# Patient Record
Sex: FEMALE | Race: BLACK OR AFRICAN AMERICAN | Employment: FULL TIME | URBAN - METROPOLITAN AREA
[De-identification: names, ages, dates, MRNs, and addresses within clinical notes are randomized per-mention and may not be internally consistent; named-entity substitution may affect disease eponyms.]

---
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
## 2020-01-29 PROBLEM — R10.2 PELVIC PAIN: Status: ACTIVE | Noted: 2020-01-29

## 2020-02-05 PROBLEM — Z30.46 NEXPLANON REMOVAL: Status: ACTIVE | Noted: 2020-02-05

## 2020-05-14 PROBLEM — Z30.46 NEXPLANON REMOVAL: Status: RESOLVED | Noted: 2020-02-05 | Resolved: 2020-05-14

## 2020-05-14 PROBLEM — Z34.01 PRIMIGRAVIDA IN FIRST TRIMESTER: Status: ACTIVE | Noted: 2020-05-14

## 2020-05-14 PROBLEM — R10.2 PELVIC PAIN: Status: RESOLVED | Noted: 2020-01-29 | Resolved: 2020-05-14

## 2020-06-22 PROBLEM — O26.899 PELVIC PAIN IN PREGNANCY, ANTEPARTUM: Status: ACTIVE | Noted: 2020-06-22

## 2020-06-22 PROBLEM — R10.2 PELVIC PAIN IN PREGNANCY, ANTEPARTUM: Status: ACTIVE | Noted: 2020-06-22

## 2020-07-16 PROBLEM — Z34.01 PRIMIGRAVIDA IN FIRST TRIMESTER: Status: RESOLVED | Noted: 2020-05-14 | Resolved: 2020-07-16

## 2020-07-16 PROBLEM — O26.899 PELVIC PAIN IN PREGNANCY, ANTEPARTUM: Status: RESOLVED | Noted: 2020-06-22 | Resolved: 2020-07-16

## 2020-07-16 PROBLEM — Z34.02 PRIMIGRAVIDA IN SECOND TRIMESTER: Status: ACTIVE | Noted: 2020-05-14

## 2020-07-16 PROBLEM — R10.2 PELVIC PAIN IN PREGNANCY, ANTEPARTUM: Status: RESOLVED | Noted: 2020-06-22 | Resolved: 2020-07-16

## 2020-07-25 ENCOUNTER — HOSPITAL ENCOUNTER (EMERGENCY)
Age: 20
Discharge: HOME | End: 2020-07-25
Attending: EMERGENCY MEDICINE
Payer: COMMERCIAL

## 2020-07-25 VITALS
SYSTOLIC BLOOD PRESSURE: 102 MMHG | DIASTOLIC BLOOD PRESSURE: 62 MMHG | TEMPERATURE: 98.6 F | HEIGHT: 64 IN | RESPIRATION RATE: 17 BRPM | BODY MASS INDEX: 20.14 KG/M2 | OXYGEN SATURATION: 100 % | HEART RATE: 96 BPM | WEIGHT: 118 LBS

## 2020-07-25 DIAGNOSIS — T78.40XA ALLERGIC REACTION, INITIAL ENCOUNTER: Primary | ICD-10-CM

## 2020-07-25 PROCEDURE — 74011250636 HC RX REV CODE- 250/636: Performed by: EMERGENCY MEDICINE

## 2020-07-25 PROCEDURE — 74011250637 HC RX REV CODE- 250/637: Performed by: EMERGENCY MEDICINE

## 2020-07-25 PROCEDURE — 99283 EMERGENCY DEPT VISIT LOW MDM: CPT

## 2020-07-25 PROCEDURE — 96372 THER/PROPH/DIAG INJ SC/IM: CPT

## 2020-07-25 RX ORDER — FAMOTIDINE 20 MG/1
40 TABLET, FILM COATED ORAL
Status: COMPLETED | OUTPATIENT
Start: 2020-07-25 | End: 2020-07-25

## 2020-07-25 RX ORDER — RANITIDINE 150 MG/1
150 TABLET, FILM COATED ORAL 2 TIMES DAILY
Qty: 10 TAB | Refills: 0 | Status: SHIPPED | OUTPATIENT
Start: 2020-07-25 | End: 2020-07-30

## 2020-07-25 RX ORDER — DIPHENHYDRAMINE HCL 25 MG
50 CAPSULE ORAL
Status: COMPLETED | OUTPATIENT
Start: 2020-07-25 | End: 2020-07-25

## 2020-07-25 RX ORDER — DIPHENHYDRAMINE HCL 25 MG
25 CAPSULE ORAL
Qty: 20 CAP | Refills: 0 | Status: SHIPPED | OUTPATIENT
Start: 2020-07-25 | End: 2020-07-30

## 2020-07-25 RX ORDER — DEXAMETHASONE SODIUM PHOSPHATE 100 MG/10ML
10 INJECTION INTRAMUSCULAR; INTRAVENOUS
Status: COMPLETED | OUTPATIENT
Start: 2020-07-25 | End: 2020-07-25

## 2020-07-25 RX ORDER — PREDNISONE 50 MG/1
50 TABLET ORAL DAILY
Qty: 5 TAB | Refills: 0 | Status: SHIPPED | OUTPATIENT
Start: 2020-07-25 | End: 2020-07-30

## 2020-07-25 RX ORDER — EPINEPHRINE 0.3 MG/.3ML
0.3 INJECTION SUBCUTANEOUS
Qty: 2 SYRINGE | Refills: 0 | Status: SHIPPED | OUTPATIENT
Start: 2020-07-25 | End: 2020-07-25

## 2020-07-25 RX ADMIN — FAMOTIDINE 40 MG: 20 TABLET, FILM COATED ORAL at 21:26

## 2020-07-25 RX ADMIN — DEXAMETHASONE SODIUM PHOSPHATE 10 MG: 10 INJECTION INTRAMUSCULAR; INTRAVENOUS at 21:26

## 2020-07-25 RX ADMIN — DIPHENHYDRAMINE HYDROCHLORIDE 50 MG: 25 CAPSULE ORAL at 21:26

## 2020-07-25 NOTE — LETTER
[id number] [address] EMERGENCY DEPT 
[address] 
[address] [id number] 
[phone] Work/School Note Date: 7/25/2020 To Whom It May concern: 
 
Loyda Bass was seen and treated today in the emergency room by the following provider(s): 
No providers found. Mahsa Ramos may return to work on 7/28/2020.  
 
Sincerely,

## 2020-07-26 NOTE — ED NOTES
I have reviewed discharge instructions with the patient. The patient verbalized understanding. Patient left ED via Discharge Method: ambulatory to Home with self. Opportunity for questions and clarification provided. Patient given 4 scripts. To continue your aftercare when you leave the hospital, you may receive an automated call from our care team to check in on how you are doing. This is a free service and part of our promise to provide the best care and service to meet your aftercare needs.  If you have questions, or wish to unsubscribe from this service please call [phone]. Thank you for Choosing our [facility] Emergency Department.

## 2020-07-26 NOTE — ED PROVIDER NOTES
Mask was worn during the entire patient examination. Sindhu Mcwilliams is a 21 y.o. female who presents to the ED with a chief complaint of rash. Patient states it is gradually worsened throughout the day today. She states almost everywhere now. She feels it on all extremities and on her torso. She has no shortness of breath. She did have one small area on her upper lip that did swell a little. No tongue swelling. She does not know of anything new that she got into today.            Past Medical History:   Diagnosis Date    Asthma     EIA, as child    Primigravida in first trimester 5/14/2020    EDC by 7 1/7 week US - irreg menses       Past Surgical History:   Procedure Laterality Date    HX ORTHOPAEDIC Left     ACL reconstruction    HX TONSILLECTOMY           Family History:   Problem Relation Age of Onset    Seizures Maternal Grandmother     Hypertension Maternal Grandmother     Hypertension Maternal Aunt     Diabetes Maternal Aunt     Breast Cancer Neg Hx     Colon Cancer Neg Hx     Ovarian Cancer Neg Hx     Uterine Cancer Neg Hx        Social History     Socioeconomic History    Marital status: SINGLE     Spouse name: Not on file    Number of children: Not on file    Years of education: Not on file    Highest education level: Not on file   Occupational History    Occupation: [employer] and [employer]   Social Needs    Financial resource strain: Not on file    Food insecurity     Worry: Not on file     Inability: Not on file   [employer] needs     Medical: Not on file     Non-medical: Not on file   Tobacco Use    Smoking status: Never Smoker    Smokeless tobacco: Never Used   Substance and Sexual Activity    Alcohol use: Not Currently    Drug use: Never    Sexual activity: Yes   Lifestyle    Physical activity     Days per week: Not on file     Minutes per session: Not on file    Stress: Not on file   Relationships    Social connections     Talks on phone: Not on file     Gets together: Not on file     Attends Bahai service: Not on file     Active member of club or organization: Not on file     Attends meetings of clubs or organizations: Not on file     Relationship status: Not on file    Intimate partner violence     Fear of current or ex partner: Not on file     Emotionally abused: Not on file     Physically abused: Not on file     Forced sexual activity: Not on file   Other Topics Concern     Service Not Asked    Blood Transfusions Not Asked    Caffeine Concern No    Occupational Exposure Not Asked   [place] Hazards Not Asked    Sleep Concern Not Asked    Stress Concern Not Asked    Weight Concern Not Asked    Special Diet Not Asked    Back Care Not Asked    Exercise No    Bike Helmet Not Asked    Seat Belt Yes    Self-Exams No   Social History Narrative    Lives with grandmother    Abuse: Feels safe at home, no history of physical abuse, no history of sexual abuse         ALLERGIES: Patient has no known allergies. Review of Systems   Constitutional: Negative for chills and fever. HENT: Negative for dental problem and voice change. Respiratory: Negative for cough, chest tightness, shortness of breath, wheezing and stridor. Cardiovascular: Negative for chest pain and palpitations. Gastrointestinal: Negative for abdominal pain, diarrhea, nausea, rectal pain and vomiting. Genitourinary: Negative for dysuria. Musculoskeletal: Negative for arthralgias, neck pain and neck stiffness. Skin: Negative for color change, pallor and wound. Neurological: Negative for weakness and numbness. All other systems reviewed and are negative. Vitals:    07/25/20 2010   BP: 106/61   Pulse: 84   Resp: 17   Temp: 98.6 °F (37 °C)   SpO2: 99%   Weight: 53.5 kg (118 lb)   Height: 5' 4\" (1.626 m)            Physical Exam  Vitals signs and nursing note reviewed. Constitutional:       General: She is not in acute distress. Appearance: Normal appearance.  She is well-developed. She is not ill-appearing, toxic-appearing or diaphoretic. HENT:      Head: Normocephalic and atraumatic. Jaw: No trismus or tenderness. Nose: Nose normal. No congestion or rhinorrhea. Mouth/Throat:      Mouth: Mucous membranes are moist.      Pharynx: Oropharynx is clear. No pharyngeal swelling, oropharyngeal exudate, posterior oropharyngeal erythema or uvula swelling. Comments: Upper lip has minimal central swelling. Eyes:      General: No scleral icterus. Conjunctiva/sclera: Conjunctivae normal.   Neck:      Musculoskeletal: No neck rigidity. Pulmonary:      Effort: Pulmonary effort is normal. No respiratory distress. Breath sounds: No stridor. No wheezing, rhonchi or rales. Abdominal:      General: Abdomen is flat. There is no distension. Palpations: There is no mass. Tenderness: There is no abdominal tenderness. There is no guarding or rebound. Hernia: No hernia is present. Skin:     General: Skin is warm. Coloration: Skin is not jaundiced or pale. Findings: Rash present. No bruising or erythema. Comments: Diffuse faint urticaria present. Patient scratching and itchy. Neurological:      General: No focal deficit present. Mental Status: She is alert and oriented to person, place, and time. Mental status is at baseline. Cranial Nerves: No cranial nerve deficit. Sensory: No sensory deficit. Psychiatric:         Mood and Affect: Mood normal.         Behavior: Behavior normal.          MDM  Number of Diagnoses or Management Options  Allergic reaction, initial encounter:   Diagnosis management comments: Patient has continued to improve on several re-evaluations that I had with her. Her lungs are clear. Her rash is clearing as well. And there is no airway swelling. Upper lip has improved as well. Patient going home with family will treat with steroids and antihistamines.   She was also given an EpiPen for symptomatic care. Mortimer Innocent, MD; 7/25/2020 @10:39 PM Voice dictation software was used during the making of this note. This software is not perfect and grammatical and other typographical errors may be present.   This note has not been proofread for errors.  ===================================================================            Procedures

## 2020-07-26 NOTE — ED TRIAGE NOTES
Pt c/o rash that started on bilateral arms and legs yesterday and today has spread to neck and on inside upper lip. Pt denies any changes in lotions, detergents. Pt denies taking any benadryl. Pt states she is 17 weeks pregnant. Pt masked upon arrival to the ED.

## 2020-07-29 PROBLEM — T78.40XA ALLERGIC REACTION: Status: ACTIVE | Noted: 2020-07-29

## 2020-09-14 PROBLEM — T78.40XA ALLERGIC REACTION: Status: RESOLVED | Noted: 2020-07-29 | Resolved: 2020-09-14

## 2020-09-14 PROBLEM — O26.842 SIZE OF FETUS INCONSISTENT WITH DATES IN SECOND TRIMESTER: Status: ACTIVE | Noted: 2020-09-14

## 2020-10-14 PROBLEM — O99.013 ANEMIA AFFECTING PREGNANCY IN THIRD TRIMESTER: Status: ACTIVE | Noted: 2020-10-14

## 2020-10-26 PROBLEM — Z34.03 PRIMIGRAVIDA IN THIRD TRIMESTER: Status: ACTIVE | Noted: 2020-05-14

## 2020-11-18 PROBLEM — O21.9 VOMITING DURING PREGNANCY IN THIRD TRIMESTER: Status: ACTIVE | Noted: 2020-11-18

## 2020-11-23 PROBLEM — O21.9 VOMITING DURING PREGNANCY IN THIRD TRIMESTER: Status: RESOLVED | Noted: 2020-11-18 | Resolved: 2020-11-23

## 2020-11-23 PROBLEM — O26.843 SIZE OF FETUS INCONSISTENT WITH DATES IN THIRD TRIMESTER: Status: ACTIVE | Noted: 2020-09-14

## 2020-12-09 PROBLEM — O16.3 ELEVATED BLOOD PRESSURE AFFECTING PREGNANCY IN THIRD TRIMESTER, ANTEPARTUM: Status: ACTIVE | Noted: 2020-12-09

## 2020-12-10 ENCOUNTER — HOSPITAL ENCOUNTER (INPATIENT)
Age: 20
LOS: 3 days | Discharge: HOME | DRG: 560 | End: 2020-12-13
Attending: OBSTETRICS & GYNECOLOGY | Admitting: OBSTETRICS & GYNECOLOGY
Payer: COMMERCIAL

## 2020-12-10 DIAGNOSIS — G89.18 POSTOPERATIVE PAIN: Primary | ICD-10-CM

## 2020-12-10 PROBLEM — O13.3 GESTATIONAL HYPERTENSION, THIRD TRIMESTER: Status: ACTIVE | Noted: 2020-12-09

## 2020-12-10 LAB
ABO + RH BLD: NORMAL
ALBUMIN SERPL-MCNC: 2.5 G/DL (ref 3.5–5)
ALBUMIN/GLOB SERPL: 0.7 {RATIO} (ref 1.2–3.5)
ALP SERPL-CCNC: 122 U/L (ref 50–130)
ALT SERPL-CCNC: 35 U/L (ref 12–65)
ANION GAP SERPL CALC-SCNC: 7 MMOL/L (ref 7–16)
AST SERPL-CCNC: 29 U/L (ref 15–37)
BILIRUB SERPL-MCNC: 0.3 MG/DL (ref 0.2–1.1)
BLOOD GROUP ANTIBODIES SERPL: NORMAL
BUN SERPL-MCNC: 3 MG/DL (ref 6–23)
CALCIUM SERPL-MCNC: 9 MG/DL (ref 8.3–10.4)
CHLORIDE SERPL-SCNC: 108 MMOL/L (ref 98–107)
CO2 SERPL-SCNC: 25 MMOL/L (ref 21–32)
CREAT SERPL-MCNC: 0.59 MG/DL (ref 0.6–1)
ERYTHROCYTE [DISTWIDTH] IN BLOOD BY AUTOMATED COUNT: 13.2 % (ref 11.9–14.6)
GLOBULIN SER CALC-MCNC: 3.8 G/DL (ref 2.3–3.5)
GLUCOSE SERPL-MCNC: 73 MG/DL (ref 65–100)
HCT VFR BLD AUTO: 34.1 % (ref 35.8–46.3)
HGB BLD-MCNC: 11.6 G/DL (ref 11.7–15.4)
MCH RBC QN AUTO: 30.1 PG (ref 26.1–32.9)
MCHC RBC AUTO-ENTMCNC: 34 G/DL (ref 31.4–35)
MCV RBC AUTO: 88.6 FL (ref 79.6–97.8)
NRBC # BLD: 0 K/UL (ref 0–0.2)
PLATELET # BLD AUTO: 231 K/UL (ref 150–450)
PMV BLD AUTO: 11.2 FL (ref 9.4–12.3)
POTASSIUM SERPL-SCNC: 3.5 MMOL/L (ref 3.5–5.1)
PROT SERPL-MCNC: 6.3 G/DL (ref 6.3–8.2)
RBC # BLD AUTO: 3.85 M/UL (ref 4.05–5.2)
SODIUM SERPL-SCNC: 140 MMOL/L (ref 136–145)
SPECIMEN EXP DATE BLD: NORMAL
WBC # BLD AUTO: 6.5 K/UL (ref 4.3–11.1)

## 2020-12-10 PROCEDURE — 85027 COMPLETE CBC AUTOMATED: CPT

## 2020-12-10 PROCEDURE — 00HU33Z INSERTION OF INFUSION DEVICE INTO SPINAL CANAL, PERCUTANEOUS APPROACH: ICD-10-PCS | Performed by: ANESTHESIOLOGY

## 2020-12-10 PROCEDURE — 75410000002 HC LABOR FEE PER 1 HR: Performed by: OBSTETRICS & GYNECOLOGY

## 2020-12-10 PROCEDURE — 36415 COLL VENOUS BLD VENIPUNCTURE: CPT

## 2020-12-10 PROCEDURE — 65270000029 HC RM PRIVATE

## 2020-12-10 PROCEDURE — 86900 BLOOD TYPING SEROLOGIC ABO: CPT

## 2020-12-10 PROCEDURE — 80053 COMPREHEN METABOLIC PANEL: CPT

## 2020-12-10 PROCEDURE — 74011250637 HC RX REV CODE- 250/637: Performed by: OBSTETRICS & GYNECOLOGY

## 2020-12-10 PROCEDURE — 3E0P7VZ INTRODUCTION OF HORMONE INTO FEMALE REPRODUCTIVE, VIA NATURAL OR ARTIFICIAL OPENING: ICD-10-PCS | Performed by: OBSTETRICS & GYNECOLOGY

## 2020-12-10 RX ORDER — MINERAL OIL
120 OIL (ML) ORAL
Status: DISPENSED | OUTPATIENT
Start: 2020-12-10 | End: 2020-12-11

## 2020-12-10 RX ORDER — ACETAMINOPHEN 500 MG
1000 TABLET ORAL
Status: DISCONTINUED | OUTPATIENT
Start: 2020-12-10 | End: 2020-12-11

## 2020-12-10 RX ORDER — FAMOTIDINE 20 MG/1
20 TABLET, FILM COATED ORAL
Status: DISCONTINUED | OUTPATIENT
Start: 2020-12-10 | End: 2020-12-11

## 2020-12-10 RX ORDER — MISOPROSTOL 100 UG/1
50 TABLET ORAL EVERY 4 HOURS
Status: DISCONTINUED | OUTPATIENT
Start: 2020-12-10 | End: 2020-12-11

## 2020-12-10 RX ORDER — LIDOCAINE HYDROCHLORIDE 10 MG/ML
1 INJECTION INFILTRATION; PERINEURAL
Status: ACTIVE | OUTPATIENT
Start: 2020-12-10 | End: 2020-12-11

## 2020-12-10 RX ORDER — LIDOCAINE HYDROCHLORIDE 20 MG/ML
JELLY TOPICAL
Status: ACTIVE | OUTPATIENT
Start: 2020-12-10 | End: 2020-12-11

## 2020-12-10 RX ORDER — BUTORPHANOL TARTRATE 2 MG/ML
1 INJECTION INTRAMUSCULAR; INTRAVENOUS
Status: DISCONTINUED | OUTPATIENT
Start: 2020-12-10 | End: 2020-12-11 | Stop reason: HOSPADM

## 2020-12-10 RX ORDER — ZOLPIDEM TARTRATE 5 MG/1
5 TABLET ORAL
Status: DISCONTINUED | OUTPATIENT
Start: 2020-12-10 | End: 2020-12-11

## 2020-12-10 RX ADMIN — MISOPROSTOL 50 MCG: 100 TABLET ORAL at 11:04

## 2020-12-10 RX ADMIN — MISOPROSTOL 50 MCG: 100 TABLET ORAL at 15:04

## 2020-12-10 NOTE — PROGRESS NOTES
Pt received 1st cytotec 50mcg at 11am  Reports mild back discomfort  R strip with no decels. Ctx's q3min  Repeat lft's wnl    No severe features. Pt reports she has a significant HA 2d ago. At that time her bp at work was 150/90 range. Will watch for severe s/s.

## 2020-12-10 NOTE — PROGRESS NOTES
Report received from Dr Tj Tolentino. I can't see labs from yday.  He reported slightly elevated lft's  Will repeat today  37+ wks by early US  GBS neg  vtx on recent [address]

## 2020-12-10 NOTE — H&P
[employer] OB H&P      Assessment/Plan    Problem List  Date Reviewed: 12/10/2020          Codes Class    Gestational hypertension, third trimester ICD-10-CM: O13.3  ICD-9-CM: 642.33     Overview Signed 2020  9:57 AM by Maria Del Carmen Gallo MD     2020:  Initial /90             Anemia affecting pregnancy in third trimester ICD-10-CM: O99.013  ICD-9-CM: 648.23, 285.9     Overview Addendum 2020  3:33 PM by Maria Del Carmen Gallo MD     Pt unable to tolerate additional Fe  2020:  FS Hgb 12.9             Size of fetus inconsistent with dates in third trimester ICD-10-CM: O26.843  ICD-9-CM: 649.63     Overview Addendum 2020  3:27 PM by Maria Del Carmen Gallo MD     2020:  [place] [address] at 25 weeks, [address] next visit  10/12/2020: EFW 70%, AC 63%, THANH nl, vertex  2020:  EFW 66%, AC 73%, THANH 15.1 cm, vtx             Primigravida in third trimester ICD-10-CM: Z34.03  ICD-9-CM: V22.0     Overview Addendum 2020  8:52 AM by Maria Del Carmen Gallo MD     EDC by 7 1/7 week US - irreg menses    2020: CF, SMA, Tetra neg  [id number]: Anatomy wnl    10/12/2020: Flu vaccine given today, TDAP recommended,                    Subjective    Mahsa Chen 21 y.o.  37w1d  presented to L&D for IOL for gest HTN. Pt has no complaints today. Pt denies vaginal bleeding/discharge. No LOF.  +FM.       OB History    Para Term  AB Living   1 0 0 0 0 0   SAB TAB Ectopic Molar Multiple Live Births   0 0 0 0 0 0      # Outcome Date GA Lbr Filippo/2nd Weight Sex Delivery Anes PTL Lv   1 Current                Past Medical History:   Diagnosis Date    Asthma     EIA, as child    Irregular heart beat     during surgery on knee    Primigravida in first trimester 2020    EDC by 7 1/7 week US - irreg menses       Past Surgical History:   Procedure Laterality Date    HX ORTHOPAEDIC Left     ACL reconstruction    HX TONSILLECTOMY         Family History   Problem Relation Age of Onset  Seizures Maternal Grandmother     Hypertension Maternal Grandmother     Hypertension Maternal Aunt     Diabetes Maternal Aunt     Breast Cancer Neg Hx     Colon Cancer Neg Hx     Ovarian Cancer Neg Hx     Uterine Cancer Neg Hx        Social History     Socioeconomic History    Marital status: SINGLE     Spouse name: Not on file    Number of children: Not on file    Years of education: Not on file    Highest education level: Not on file   Occupational History    Occupation: [employer]   Social Needs    Financial resource strain: Not on file    Food insecurity     Worry: Not on file     Inability: Not on file   [employer] needs     Medical: Not on file     Non-medical: Not on file   Tobacco Use    Smoking status: Never Smoker    Smokeless tobacco: Never Used   Substance and Sexual Activity    Alcohol use: Not Currently    Drug use: Never    Sexual activity: Yes   Lifestyle    Physical activity     Days per week: Not on file     Minutes per session: Not on file    Stress: Not on file   Relationships    Social connections     Talks on phone: Not on file     Gets together: Not on file     Attends Sikhism service: Not on file     Active member of club or organization: Not on file     Attends meetings of clubs or organizations: Not on file     Relationship status: Not on file    Intimate partner violence     Fear of current or ex partner: Not on file     Emotionally abused: Not on file     Physically abused: Not on file     Forced sexual activity: Not on file   Other Topics Concern     Service Not Asked    Blood Transfusions Not Asked    Caffeine Concern No    Occupational Exposure Not Asked   Raymond Lunch Hazards Not Asked    Sleep Concern Not Asked    Stress Concern Not Asked    Weight Concern Not Asked    Special Diet Not Asked    Back Care Not Asked    Exercise No    Bike Helmet Not Asked    Seat Belt Yes    Self-Exams No   Social History Narrative    Lives with grandmother    Abuse: Feels safe at home, no history of physical abuse, no history of sexual abuse       No Known Allergies      Review of Systems:    Constitutional: No fevers or chills     Prenatal: + fetal movement, no VB/DC, no LOF     CV: No chest pain or palpatations    Resp: No SOB or cough    GI: No nausea/vomiting/diarrhea/constipation    Neuro: No HA, no seizure like activity    Skin: No rashes or lesions     Breast: No breast pain    : No dysuria or hematuria      Prenatal Record Review    The prenatal record has been reviewed.     Prenatal Labs:   No results found for: RUBELLAEXT, GRBSEXT, HBSAGEXT, HIVEXT, RPREXT, GONNOEXT, CHLAMEXT    Objective    Visit Vitals  LMP 01/30/2020         Obstetric Exam    SVE: 1/70/-3      Physical Exam    Gen: alert and cooperative, NAD    HEENT: NCAT    CV: RRR    RESP: CTA bilat    ABD: Gravid, soft, NT    EXT: trace edema bilat    NEURO: No focal deficits    SKIN: No noted rashes or lesions       Nina Molina MD  10:13 AM  12/10/20

## 2020-12-11 ENCOUNTER — ANESTHESIA EVENT (OUTPATIENT)
Dept: LABOR AND DELIVERY | Age: 20
DRG: 560 | End: 2020-12-11
Payer: COMMERCIAL

## 2020-12-11 ENCOUNTER — ANESTHESIA (OUTPATIENT)
Dept: LABOR AND DELIVERY | Age: 20
DRG: 560 | End: 2020-12-11
Payer: COMMERCIAL

## 2020-12-11 PROBLEM — O14.90 PRE-ECLAMPSIA: Status: ACTIVE | Noted: 2020-12-11

## 2020-12-11 LAB
ARTERIAL PATENCY WRIST A: ABNORMAL
ARTERIAL PATENCY WRIST A: ABNORMAL
BASE DEFICIT BLD-SCNC: 3 MMOL/L
BASE DEFICIT BLD-SCNC: 4 MMOL/L
BDY SITE: ABNORMAL
BDY SITE: ABNORMAL
CA-I BLD-MCNC: 1.48 MMOL/L (ref 1.12–1.32)
CA-I BLD-MCNC: 1.49 MMOL/L (ref 1.12–1.32)
COLLECT TIME,HTIME: 1501
COLLECT TIME,HTIME: 1501
GAS FLOW.O2 O2 DELIVERY SYS: ABNORMAL L/MIN
GAS FLOW.O2 O2 DELIVERY SYS: ABNORMAL L/MIN
GLUCOSE BLD STRIP.AUTO-MCNC: 66 MG/DL (ref 65–100)
GLUCOSE BLD STRIP.AUTO-MCNC: 70 MG/DL (ref 65–100)
HCO3 BLD-SCNC: 23.1 MMOL/L (ref 22–26)
HCO3 BLD-SCNC: 23.6 MMOL/L (ref 22–26)
PCO2 BLD: 44.2 MMHG (ref 35–45)
PCO2 BLD: 48.8 MMHG (ref 35–45)
PH BLD: 7.29 [PH] (ref 7.35–7.45)
PH BLD: 7.33 [PH] (ref 7.35–7.45)
PO2 BLD: 18 MMHG (ref 75–100)
PO2 BLD: 22 MMHG (ref 75–100)
POTASSIUM BLD-SCNC: 4.1 MMOL/L (ref 3.5–5.1)
POTASSIUM BLD-SCNC: 5.8 MMOL/L (ref 3.5–5.1)
SAO2 % BLD: 21 % (ref 95–98)
SAO2 % BLD: 34 % (ref 95–98)
SERVICE CMNT-IMP: ABNORMAL
SERVICE CMNT-IMP: ABNORMAL
SODIUM BLD-SCNC: 136 MMOL/L (ref 136–145)
SODIUM BLD-SCNC: 138 MMOL/L (ref 136–145)
SPECIMEN TYPE: ABNORMAL
SPECIMEN TYPE: ABNORMAL

## 2020-12-11 PROCEDURE — 77030019905 HC CATH URETH INTMIT MDII -A

## 2020-12-11 PROCEDURE — 75410000003 HC RECOV DEL/VAG/CSECN EA 0.5 HR

## 2020-12-11 PROCEDURE — 77030011945 HC CATH URIN INT ST MENT -A

## 2020-12-11 PROCEDURE — 75410000000 HC DELIVERY VAGINAL/SINGLE

## 2020-12-11 PROCEDURE — 82803 BLOOD GASES ANY COMBINATION: CPT

## 2020-12-11 PROCEDURE — A4300 CATH IMPL VASC ACCESS PORTAL: HCPCS | Performed by: ANESTHESIOLOGY

## 2020-12-11 PROCEDURE — 74011000250 HC RX REV CODE- 250: Performed by: ANESTHESIOLOGY

## 2020-12-11 PROCEDURE — 0HQ9XZZ REPAIR PERINEUM SKIN, EXTERNAL APPROACH: ICD-10-PCS | Performed by: OBSTETRICS & GYNECOLOGY

## 2020-12-11 PROCEDURE — 74011250637 HC RX REV CODE- 250/637: Performed by: OBSTETRICS & GYNECOLOGY

## 2020-12-11 PROCEDURE — 74011250636 HC RX REV CODE- 250/636: Performed by: OBSTETRICS & GYNECOLOGY

## 2020-12-11 PROCEDURE — 77030018846 HC SOL IRR STRL H20 ICUM -A

## 2020-12-11 PROCEDURE — 82947 ASSAY GLUCOSE BLOOD QUANT: CPT

## 2020-12-11 PROCEDURE — 75410000002 HC LABOR FEE PER 1 HR

## 2020-12-11 PROCEDURE — 65270000029 HC RM PRIVATE

## 2020-12-11 PROCEDURE — 10907ZC DRAINAGE OF AMNIOTIC FLUID, THERAPEUTIC FROM PRODUCTS OF CONCEPTION, VIA NATURAL OR ARTIFICIAL OPENING: ICD-10-PCS | Performed by: OBSTETRICS & GYNECOLOGY

## 2020-12-11 PROCEDURE — 76060000078 HC EPIDURAL ANESTHESIA

## 2020-12-11 PROCEDURE — 74011250636 HC RX REV CODE- 250/636: Performed by: REGISTERED NURSE

## 2020-12-11 PROCEDURE — 77030014125 HC TY EPDRL BBMI -B: Performed by: ANESTHESIOLOGY

## 2020-12-11 PROCEDURE — 74011250636 HC RX REV CODE- 250/636: Performed by: NURSE ANESTHETIST, CERTIFIED REGISTERED

## 2020-12-11 PROCEDURE — 74011000250 HC RX REV CODE- 250: Performed by: OBSTETRICS & GYNECOLOGY

## 2020-12-11 PROCEDURE — 77010026065 HC OXYGEN MINIMUM MEDICAL AIR

## 2020-12-11 PROCEDURE — 77030003028 HC SUT VCRL J&J -A

## 2020-12-11 PROCEDURE — 2709999900 HC NON-CHARGEABLE SUPPLY

## 2020-12-11 RX ORDER — NALOXONE HYDROCHLORIDE 0.4 MG/ML
0.4 INJECTION, SOLUTION INTRAMUSCULAR; INTRAVENOUS; SUBCUTANEOUS AS NEEDED
Status: DISCONTINUED | OUTPATIENT
Start: 2020-12-11 | End: 2020-12-13 | Stop reason: HOSPADM

## 2020-12-11 RX ORDER — SODIUM CHLORIDE 0.9 % (FLUSH) 0.9 %
5-40 SYRINGE (ML) INJECTION AS NEEDED
Status: DISCONTINUED | OUTPATIENT
Start: 2020-12-11 | End: 2020-12-11

## 2020-12-11 RX ORDER — DEXTROSE, SODIUM CHLORIDE, SODIUM LACTATE, POTASSIUM CHLORIDE, AND CALCIUM CHLORIDE 5; .6; .31; .03; .02 G/100ML; G/100ML; G/100ML; G/100ML; G/100ML
125 INJECTION, SOLUTION INTRAVENOUS CONTINUOUS
Status: DISCONTINUED | OUTPATIENT
Start: 2020-12-11 | End: 2020-12-11

## 2020-12-11 RX ORDER — SIMETHICONE 80 MG
80 TABLET,CHEWABLE ORAL
Status: DISCONTINUED | OUTPATIENT
Start: 2020-12-11 | End: 2020-12-13 | Stop reason: HOSPADM

## 2020-12-11 RX ORDER — ROPIVACAINE HYDROCHLORIDE 2 MG/ML
INJECTION, SOLUTION EPIDURAL; INFILTRATION; PERINEURAL
Status: DISCONTINUED | OUTPATIENT
Start: 2020-12-11 | End: 2020-12-11 | Stop reason: HOSPADM

## 2020-12-11 RX ORDER — DIPHENHYDRAMINE HCL 25 MG
25-50 CAPSULE ORAL
Status: DISCONTINUED | OUTPATIENT
Start: 2020-12-11 | End: 2020-12-13 | Stop reason: HOSPADM

## 2020-12-11 RX ORDER — OXYTOCIN/RINGER'S LACTATE 30/500 ML
10 PLASTIC BAG, INJECTION (ML) INTRAVENOUS AS NEEDED
Status: COMPLETED | OUTPATIENT
Start: 2020-12-11 | End: 2020-12-11

## 2020-12-11 RX ORDER — ZOLPIDEM TARTRATE 5 MG/1
10 TABLET ORAL
Status: DISCONTINUED | OUTPATIENT
Start: 2020-12-11 | End: 2020-12-13 | Stop reason: HOSPADM

## 2020-12-11 RX ORDER — OXYTOCIN/RINGER'S LACTATE 30/500 ML
0-20 PLASTIC BAG, INJECTION (ML) INTRAVENOUS
Status: DISCONTINUED | OUTPATIENT
Start: 2020-12-11 | End: 2020-12-11

## 2020-12-11 RX ORDER — DOCUSATE SODIUM 100 MG/1
100 CAPSULE, LIQUID FILLED ORAL 2 TIMES DAILY
Status: DISCONTINUED | OUTPATIENT
Start: 2020-12-11 | End: 2020-12-13 | Stop reason: HOSPADM

## 2020-12-11 RX ORDER — MINERAL OIL
120 OIL (ML) ORAL AS NEEDED
Status: DISCONTINUED | OUTPATIENT
Start: 2020-12-11 | End: 2020-12-11

## 2020-12-11 RX ORDER — LIDOCAINE HYDROCHLORIDE 20 MG/ML
JELLY TOPICAL
Status: DISCONTINUED | OUTPATIENT
Start: 2020-12-11 | End: 2020-12-11 | Stop reason: CLARIF

## 2020-12-11 RX ORDER — LIDOCAINE HYDROCHLORIDE 10 MG/ML
10 INJECTION INFILTRATION; PERINEURAL
Status: COMPLETED | OUTPATIENT
Start: 2020-12-11 | End: 2020-12-11

## 2020-12-11 RX ORDER — LIDOCAINE HYDROCHLORIDE AND EPINEPHRINE 15; 5 MG/ML; UG/ML
INJECTION, SOLUTION EPIDURAL
Status: COMPLETED | OUTPATIENT
Start: 2020-12-11 | End: 2020-12-11

## 2020-12-11 RX ORDER — SODIUM CHLORIDE 0.9 % (FLUSH) 0.9 %
5-40 SYRINGE (ML) INJECTION EVERY 8 HOURS
Status: DISCONTINUED | OUTPATIENT
Start: 2020-12-11 | End: 2020-12-11

## 2020-12-11 RX ORDER — OXYTOCIN/RINGER'S LACTATE 30/500 ML
87.3 PLASTIC BAG, INJECTION (ML) INTRAVENOUS AS NEEDED
Status: DISCONTINUED | OUTPATIENT
Start: 2020-12-11 | End: 2020-12-11

## 2020-12-11 RX ORDER — DOCUSATE SODIUM 100 MG/1
100 CAPSULE, LIQUID FILLED ORAL 2 TIMES DAILY
Status: DISCONTINUED | OUTPATIENT
Start: 2020-12-11 | End: 2020-12-11

## 2020-12-11 RX ORDER — ROPIVACAINE HYDROCHLORIDE 2 MG/ML
INJECTION, SOLUTION EPIDURAL; INFILTRATION; PERINEURAL AS NEEDED
Status: DISCONTINUED | OUTPATIENT
Start: 2020-12-11 | End: 2020-12-11 | Stop reason: HOSPADM

## 2020-12-11 RX ORDER — FENTANYL CITRATE 50 UG/ML
INJECTION, SOLUTION INTRAMUSCULAR; INTRAVENOUS AS NEEDED
Status: DISCONTINUED | OUTPATIENT
Start: 2020-12-11 | End: 2020-12-11 | Stop reason: HOSPADM

## 2020-12-11 RX ORDER — LIDOCAINE HYDROCHLORIDE 20 MG/ML
JELLY TOPICAL
Status: COMPLETED | OUTPATIENT
Start: 2020-12-11 | End: 2020-12-11

## 2020-12-11 RX ORDER — HYDROCODONE BITARTRATE AND ACETAMINOPHEN 5; 325 MG/1; MG/1
1-2 TABLET ORAL
Status: DISCONTINUED | OUTPATIENT
Start: 2020-12-11 | End: 2020-12-13 | Stop reason: HOSPADM

## 2020-12-11 RX ORDER — HYDROCORTISONE 25 MG/G
CREAM TOPICAL 4 TIMES DAILY
Status: DISCONTINUED | OUTPATIENT
Start: 2020-12-11 | End: 2020-12-13 | Stop reason: HOSPADM

## 2020-12-11 RX ORDER — IBUPROFEN 800 MG/1
800 TABLET ORAL
Status: DISCONTINUED | OUTPATIENT
Start: 2020-12-11 | End: 2020-12-13 | Stop reason: HOSPADM

## 2020-12-11 RX ORDER — FENTANYL CITRATE 50 UG/ML
INJECTION, SOLUTION INTRAMUSCULAR; INTRAVENOUS
Status: COMPLETED
Start: 2020-12-11 | End: 2020-12-11

## 2020-12-11 RX ADMIN — MISOPROSTOL 50 MCG: 100 TABLET ORAL at 00:32

## 2020-12-11 RX ADMIN — Medication 2 MILLI-UNITS/MIN: at 06:58

## 2020-12-11 RX ADMIN — BUTORPHANOL TARTRATE 1 MG: 2 INJECTION, SOLUTION INTRAMUSCULAR; INTRAVENOUS at 04:27

## 2020-12-11 RX ADMIN — ACETAMINOPHEN 1000 MG: 500 TABLET, FILM COATED ORAL at 11:42

## 2020-12-11 RX ADMIN — SODIUM CHLORIDE, SODIUM LACTATE, POTASSIUM CHLORIDE, CALCIUM CHLORIDE, AND DEXTROSE MONOHYDRATE 125 ML/HR: 600; 310; 30; 20; 5 INJECTION, SOLUTION INTRAVENOUS at 06:09

## 2020-12-11 RX ADMIN — Medication 3 ML: at 12:19

## 2020-12-11 RX ADMIN — IBUPROFEN 800 MG: 800 TABLET ORAL at 19:46

## 2020-12-11 RX ADMIN — LIDOCAINE HYDROCHLORIDE 1 ML: 10 INJECTION, SOLUTION INFILTRATION; PERINEURAL at 15:11

## 2020-12-11 RX ADMIN — Medication 6 ML: at 06:01

## 2020-12-11 RX ADMIN — DOCUSATE SODIUM 100 MG: 100 CAPSULE, LIQUID FILLED ORAL at 19:46

## 2020-12-11 RX ADMIN — LIDOCAINE HYDROCHLORIDE,EPINEPHRINE BITARTRATE 3 ML: 15; .005 INJECTION, SOLUTION EPIDURAL; INFILTRATION; INTRACAUDAL; PERINEURAL at 05:59

## 2020-12-11 RX ADMIN — SODIUM CHLORIDE, SODIUM LACTATE, POTASSIUM CHLORIDE, AND CALCIUM CHLORIDE 500 ML: 600; 310; 30; 20 INJECTION, SOLUTION INTRAVENOUS at 05:45

## 2020-12-11 RX ADMIN — Medication 1 SPRAY: at 19:46

## 2020-12-11 RX ADMIN — ROPIVACAINE HYDROCHLORIDE 9 ML/HR: 2 INJECTION, SOLUTION EPIDURAL; INFILTRATION at 06:03

## 2020-12-11 RX ADMIN — LIDOCAINE HYDROCHLORIDE: 20 JELLY TOPICAL at 15:11

## 2020-12-11 RX ADMIN — Medication: at 15:11

## 2020-12-11 RX ADMIN — FENTANYL CITRATE 100 MCG: 50 INJECTION INTRAMUSCULAR; INTRAVENOUS at 12:22

## 2020-12-11 RX ADMIN — MINERAL OIL 120 ML: 1000 LIQUID ORAL at 13:50

## 2020-12-11 RX ADMIN — ROPIVACAINE HYDROCHLORIDE: 2 INJECTION, SOLUTION EPIDURAL; INFILTRATION at 09:41

## 2020-12-11 NOTE — PROGRESS NOTES
Report received from AISSATOU August RN. Pt care assumed. O2 applied. Pt c/o pain 6/10 and rectal pressure. SVE with moderate caput. Pt very uncomfortable with exam. CRNA called for dose.

## 2020-12-11 NOTE — PROGRESS NOTES
2/70/-1--2  arom clear fluid  130/mod variability/no decels/spont accels  Start pitocin when able. Last cytotec was 12:30.

## 2020-12-11 NOTE — PROGRESS NOTES
SBAR OUT Report: Mother    Verbal report given to RICH Dumont RN on this patient, who is now being transferred to [facility] for routine progression of care. The patient is not wearing a green \"Anesthesia-Duramorph\" band. Report consisted of patient's Situation, Background, Assessment and Recommendations (SBAR). [place] ID bands were compared with the identification form, and verified with the patient and receiving nurse. Information from the SBAR and the [facility] Report was reviewed with the receiving nurse; opportunity for questions and clarification provided.

## 2020-12-11 NOTE — PROGRESS NOTES
Clare Johansen at bedside at 9304. AISSATOU Katz at bedside at [facility] pt to sitting up on bedside at 0550. Timeout completed at 8361 with MD, AISSATOU and myself at bedside. Test dose given at 0559. Negative reaction. Dose given at 0605. Pt assisted to lying back in left tilt position. See anesthesia record for details. See vital sign flow sheet for BP. Tolerated procedure well.

## 2020-12-11 NOTE — PROGRESS NOTES
Dr. Harish Manzano updated on pt SVE 1.5/70/-3, contraction q 1-2 min at this time, and FHT with moderate variability but 2 variables. Give cytotec once contractions space out per MD. Last dose of cytotec should not be past 4am. Call MD at 6 am for update and pitocin orders.  Ot may have ambien, tylenol and pepcid PRN per MD.

## 2020-12-11 NOTE — ANESTHESIA PREPROCEDURE EVALUATION
Relevant Problems   No relevant active problems       Anesthetic History               Review of Systems / Medical History  Patient summary reviewed and pertinent labs reviewed    Pulmonary            Asthma        Neuro/Psych              Cardiovascular    Hypertension              Exercise tolerance: >4 METS  Comments: Reports h/o irregular HR with knee surgery that required a longer PACU stay, never saw a cardiologist, reports good exercise tolerance   GI/Hepatic/Renal  Within defined limits              Endo/Other  Within defined limits           Other Findings              Physical Exam    Airway  Mallampati: II  TM Distance: 4 - 6 cm  Neck ROM: normal range of motion   Mouth opening: Normal     Cardiovascular    Rhythm: regular  Rate: normal         Dental  No notable dental hx       Pulmonary  Breath sounds clear to auscultation               Abdominal         Other Findings            Anesthetic Plan    ASA: 2  Anesthesia type: epidural            Anesthetic plan and risks discussed with: Patient

## 2020-12-11 NOTE — PROGRESS NOTES
SBAR IN Report: Mother    Verbal report received from [employer]  on this patient, who is now being transferred from L&D for routine progression of care. The patient is not wearing a green \"Anesthesia-Duramorph\" band. Report consisted of patient's Situation, Background, Assessment and Recommendations (SBAR).  ID bands were compared with the identification form, and verified with the patient and transferring nurse. Information from the SBAR and the [facility] Report was reviewed with the transferring nurse; opportunity for questions and clarification provided.

## 2020-12-11 NOTE — PROGRESS NOTES
Patient up to bathroom with RN assistance. Saige-care taught and completed. Questions encouraged and answered. Patient ambulating without difficulty, encouraged to call for needs or concerns. Verbalizes understanding.

## 2020-12-11 NOTE — PROGRESS NOTES
1455 Call for delivery staff. 1501 VD viable male, Apgars 9&9, Skin to skin with mom. See Delivery summary for details. 1507 Pitocin infusion begun at 250 per MD.   9568 PLacenta expressed. Repair in progress. [facility] completed. See Delivery summary for details.

## 2020-12-11 NOTE — PROGRESS NOTES
Admission assessment complete as noted. Patient oriented to room and unit. Plan of care reviewed and patient verbalizes understanding. Questions encouraged and answered. Patent encouraged to call for needs or concerns. Safety Teaching reviewed:   1. Hand hygiene prior to handling the infant. 2. Use of bulb syringe. 3. Bracelets with matching numbers are placed on mother and infant  4. An infant security tag  [facility]) is placed on the infant's ankle and monitored  5. All OB nurses wear pink Employee badges - do not give your baby to anyone without proper identification. 6. Never leave the baby alone in the room. 7. The infant should be placed on their back to sleep. on a firm mattress. No toys should be placed in the crib. (safe sleep video offered to view)  8. Never shake the baby (video offered to view)  9. Infant fall prevention - do not sleep with the baby, and place the baby in the crib while ambulating. 8. Mother and Baby Care booklet given to Mother.

## 2020-12-11 NOTE — PROGRESS NOTES
Dr. Raymundo Pickering at bedside from [facility].

## 2020-12-11 NOTE — PROGRESS NOTES
PT reports increased pressure and urge to push. Dr. Kimberly Yoo called and informed of progress and interventions. Will be on the unit to assess shortly.

## 2020-12-11 NOTE — L&D DELIVERY NOTE
Delivery Summary    Patient: Suzzanne Babinski MRN: [id number]  SSN: [id number]    YOB: 2000  Age: 21 y.o. Sex: female       Information for the patient's :  Leona Tapia [[id number]]       Labor Events:    Labor: No    Steroids: None   Cervical Ripening Date/Time: 12/10/2020 11:04 AM   Cervical Ripening Type: Misoprostol   Antibiotics During Labor: No   Rupture Identifier:      Rupture Date/Time: 2020 2:51 AM   Rupture Type: AROM   Amniotic Fluid Volume:      Amniotic Fluid Description: Clear    Amniotic Fluid Odor:      Induction:         Induction Date/Time:        Indications for Induction:      Augmentation: Oxytocin   Augmentation Date/Time: 20206:58 AM   Indications for Augmentation: Hypertension   Labor complications: Shoulder Dystocia       Additional complications:        Delivery Events:  Indications For Episiotomy:     Episiotomy:     Perineal Laceration(s):     Repaired:     Periurethral Laceration Location:      Repaired:     Labial Laceration Location:     Repaired:     Sulcal Laceration Location:     Repaired:     Vaginal Laceration Location:     Repaired:     Cervical Laceration Location:     Repaired:     Repair Suture: Vicryl 3-0   Number of Repair Packets: 1   Estimated Blood Loss (ml):  ml   Quantitative Blood Loss (ml)                Delivery Date: 2020    Delivery Time: 3:01 PM  Delivery Type: Vaginal, Vacuum (Extractor)  Sex:  Male    Gestational Age: 42w2d   Delivery Clinician:  Kristina Solis  Living Status: Living   Delivery Location: L&D 433          APGARS  One minute Five minutes Ten minutes   Skin color: 1   1        Heart rate: 2   2        Grimace: 2   2        Muscle tone: 2   2        Breathin   2        Totals: 9   9            Presentation: Vertex    Position: Left Occiput Anterior  Resuscitation Method:  Suctioning-bulb; Tactile Stimulation     Meconium Stained: None      Cord Information: 3 Vessels  Complications: None  Cord around:    Delayed cord clamping? Yes  Cord clamped date/time:2020  3:03 PM  Disposition of Cord Blood: Lab    Blood Gases Sent?: Yes    Placenta:  Date/Time: 2020  3:11 PM  Removal: Expressed      Appearance: Normal;Intact      Measurements:  Birth Weight: 6 lb 13.7 oz (3.11 kg)      Birth Length: 51.5 cm      Head Circumference: 32 cm      Chest Circumference: 31 cm     Abdominal Girth: Other Providers:   Chidi WERNER;ISRA TORRES;NEGAR LANIER;FELECIA FOREMAN, Obstetrician;Primary Nurse;Primary [place] Nurse;Scrub Tech;Charge Nurse           Group B Strep: No results found for: William Chaves  Information for the patient's :  Kelly Tinsley [[id number]]     Lab Results   Component Value Date/Time    ABO/Rh(D) A POSITIVE 2020 03:01 PM    FLORIN IgG NEG 2020 03:01 PM      Recent Labs     20  1516 20  1515   HCO3I 23.1 23.6   SO2I 29* 21*   IBD 3 4   SPECTI VENOUS CORD ARTERIAL CORD   ISITE CORD CORD   IDEV OTHER OTHER   IALLEN NOT APPLICABLE NOT APPLICABLE      Delivery Note      No results found for: APH, APCO2, APO2, AHCO3, ABEC, ABDC, O2ST, SITE, RSCOM         No results found for: RUBELLAEXT, GRBSEXT         Procedure:  Patient became completely dilated and pushed. She became very fatigued and her epidural was not working well. She was having trouble pushing past the 3+ station. She consented to vacuum application . Vacuum was applied easily and pressure increased to 550mm Hg during the next contraction. She delivered the head to the chin and then the vacuum lost pressure and popped off. She continued to push. It was evident that there was a shoulder dystocia. She delivered the entire head. She was already in McRobert's position. Gentle traction did not deliver the shoulder. Suprapubic pressure was applied which delivered the right shoulder.    Baby delivered in the bed, was dried. Baby quickly had great tone, color, reflexes, etc. The cord was clamped and cut. Cord pH and cord blood was obtained. The baby was placed on mom in a dry blanket or towel. The perineum was examined. She had a vaginal tear just inside the perineum. It was repaired with 3.0 vicryl. She had some bilateral periurethral tears that did not require suturing. The placenta was delivered spontaneously. It was examined. It was intact with three vessels. Mom and baby are doing well.     Arterial pH: 7.29  Venous pH: 7.33     Juan Alberto Renae MD  12/11/2020  6:43 PM

## 2020-12-11 NOTE — ANESTHESIA PROCEDURE NOTES
Epidural Block    Start time: 12/11/2020 5:56 AM  End time: 12/11/2020 5:59 AM  Performed by: Alejandro Naik MD  Authorized by:  Alejandro Naik MD     Pre-Procedure  Indication: labor epidural    Preanesthetic Checklist: patient identified, risks and benefits discussed, anesthesia consent, site marked, patient being monitored, timeout performed and anesthesia consent    Timeout Time: 05:54        Epidural:   Patient position:  Seated  Prep region:  Lumbar  Prep: Patient draped and Chlorhexidine    Location:  L1-2    Needle and Epidural Catheter:   Needle Type:  Tuohy  Needle Gauge:  17 G  Injection Technique:  Loss of resistance using air  Attempts:  1  Catheter Size:  19 G  Catheter at Skin Depth (cm):  10  Depth in Epidural Space (cm):  6  Events: no blood with aspiration, no cerebrospinal fluid with aspiration, no paresthesia and negative aspiration test    Test Dose:  Negative    Assessment:   Catheter Secured:  Tegaderm and tape  Insertion:  Uncomplicated  Patient tolerance:  Patient tolerated the procedure well with no immediate complications

## 2020-12-12 PROCEDURE — 65270000029 HC RM PRIVATE

## 2020-12-12 PROCEDURE — 74011250637 HC RX REV CODE- 250/637: Performed by: OBSTETRICS & GYNECOLOGY

## 2020-12-12 PROCEDURE — 2709999900 HC NON-CHARGEABLE SUPPLY

## 2020-12-12 RX ADMIN — IBUPROFEN 800 MG: 800 TABLET ORAL at 02:07

## 2020-12-12 RX ADMIN — IBUPROFEN 800 MG: 800 TABLET ORAL at 08:03

## 2020-12-12 RX ADMIN — DOCUSATE SODIUM 100 MG: 100 CAPSULE, LIQUID FILLED ORAL at 16:58

## 2020-12-12 RX ADMIN — HYDROCORTISONE: 25 CREAM TOPICAL at 09:00

## 2020-12-12 RX ADMIN — IBUPROFEN 800 MG: 800 TABLET ORAL at 16:58

## 2020-12-12 RX ADMIN — HYDROCORTISONE: 25 CREAM TOPICAL at 02:14

## 2020-12-12 RX ADMIN — DOCUSATE SODIUM 100 MG: 100 CAPSULE, LIQUID FILLED ORAL at 08:03

## 2020-12-12 RX ADMIN — HYDROCORTISONE: 25 CREAM TOPICAL at 17:00

## 2020-12-12 RX ADMIN — IBUPROFEN 800 MG: 800 TABLET ORAL at 23:01

## 2020-12-12 NOTE — LACTATION NOTE
This note was copied from a baby's chart. Brought breast pump to bedside per mother's request.  Demonstrated how to use and set up pump; assisted mother with pumping. Instructed pt to call out for assistance with collecting colostrum. Pt verbalized understanding.

## 2020-12-12 NOTE — LACTATION NOTE
This note was copied from a baby's chart. Mom just finished using breast pump, pumped 3cc and will feed when infant wakes in an hour or so states mom. . Did wipe colostrum from pump parts and give to infant with gloved finger.

## 2020-12-12 NOTE — LACTATION NOTE
This note was copied from a baby's chart. In to see mom and infant for the first time. Mom had just finished pumping and she had expressed 1.2 ml colostrum. Instructed mom and dad on how to collect the colostrum. The bedside nurse was also present and instructed mom and dad on how to feed it to infant with the syringe.

## 2020-12-12 NOTE — ANESTHESIA POSTPROCEDURE EVALUATION
Labor epidural  Anesthesia Post Evaluation      Multimodal analgesia: multimodal analgesia used between 6 hours prior to anesthesia start to PACU discharge  Patient location during evaluation: PACU  Patient participation: complete - patient participated  Level of consciousness: awake and alert  Pain management: adequate  Airway patency: patent  Anesthetic complications: no  Cardiovascular status: acceptable  Respiratory status: acceptable  Hydration status: acceptable  Comments: The patient was satisfied with her labor epidural and denies any complications. Her lower extremities have returned to baseline neurologically.   Post anesthesia nausea and vomiting:  none  Final Post Anesthesia Temperature Assessment:  Normothermia (36.0-37.5 degrees C)

## 2020-12-12 NOTE — PROGRESS NOTES
Post-Partum Day Number 1 Progress Note    Patient doing well post-partum day #1 without significant complaint. Voiding without difficulty, normal lochia. Patient Vitals for the past 24 hrs:   Temp Pulse Resp BP SpO2   12/12/20 0743 98.1 °F (36.7 °C) 80 18 131/78 97 %   12/12/20 0400 98 °F (36.7 °C) 77  132/76    12/12/20 0020 97.8 °F (36.6 °C) 73 18 130/75 97 %   12/11/20 1930 97.8 °F (36.6 °C) 99 18 126/85 97 %   12/11/20 1825 98 °F (36.7 °C) 100 18 (!) 142/84 97 %   12/11/20 1716 98.9 °F (37.2 °C) 69 18 (!) 159/84    12/11/20 1701  81  (!) 158/72    12/11/20 1646  91  (!) 150/82    12/11/20 1631  73  (!) 146/78    12/11/20 1616  79  (!) 146/79    12/11/20 1601  81  126/82    12/11/20 1546  96  138/81    12/11/20 1531  84  134/78    12/11/20 1516 98.7 °F (37.1 °C) 87 18 136/86    12/11/20 1502  (!) 121  (!) 159/85    12/11/20 1447  75  137/86    12/11/20 1401  (!) 117  135/83    12/11/20 1346  85  (!) 140/92    12/11/20 1331  75  (!) 163/106    12/11/20 1319  74  (!) 157/90    12/11/20 1304 98.6 °F (37 °C) 74 20 (!) 170/94    12/11/20 1249  85  (!) 143/81    12/11/20 1235  88  (!) 148/88    12/11/20 1230    (!) 148/88    12/11/20 1222  86  (!) 141/84    12/11/20 1219  78  (!) 145/81    12/11/20 1204  84  (!) 148/91    12/11/20 1119 (!) 100.5 °F (38.1 °C) 78 18 (!) 156/96    12/11/20 1104 100.1 °F (37.8 °C) 86  (!) 148/90    12/11/20 1050  77  (!) 156/96    12/11/20 1038  76  (!) 151/97    12/11/20 1035  80  (!) 159/102    12/11/20 1019  75  132/81    12/11/20 1004  86  (!) 147/92    12/11/20 0949  77  (!) 156/97    12/11/20 0937  85  (!) 144/90    12/11/20 0934  77  (!) 149/101    12/11/20 0905 99.1 °F (37.3 °C) (!) 109 18 (!) 136/91    12/11/20 0849  68  131/78        Exam:  Patient without distress.                Abdomen soft, fundus firm at level of umbilicus, nontender Perineum with normal lochia noted. Lower extremities are negative for swelling, cords or tenderness.         Recent Labs     12/10/20  1045 12/09/20  1017 10/13/20  0924 05/14/20  0928   WBC 6.5 6.7 6.5 4.7   HGB 11.6* 11.7 10.9* 11.9   HCT 34.1* 33.7* 32.8* 35.1    233 203 261       Recent Labs     12/10/20  1154 12/09/20  1017    140   K 3.5 3.6   * 105   CO2 25 20   AGAP 7  --    GLU 73 106*   BUN 3* 3*   CREA 0.59* 0.61   GFRAA >60 151   GFRNA >60 131   CA 9.0 8.8   ALB 2.5* 3.4*   TP 6.3 6.0   GLOB 3.8*  --    AGRAT 0.7* 1.3   AST 29 41*   ALT 35 37*       Recent Labs     12/09/20  1017   URICA 4.5   *            Recent Labs     12/11/20  1516 12/11/20  1515 12/10/20  1154 12/09/20  1017   GLU  --   --  73 106*   GLUCPOC 70 66  --   --          Problem List  Date Reviewed: 12/10/2020          Codes Class Noted    Pre-eclampsia ICD-10-CM: O14.90  ICD-9-CM: 642.40  12/11/2020        * (Principal) Gestational hypertension, third trimester ICD-10-CM: O13.3  ICD-9-CM: 642.33  12/9/2020    Overview Signed 12/9/2020  9:57 AM by Gerald Newby MD     12/9/2020:  Initial /90             Anemia affecting pregnancy in third trimester ICD-10-CM: O99.013  ICD-9-CM: 648.23, 285.9  10/14/2020    Overview Addendum 12/2/2020  3:33 PM by Gerald Newby MD     Pt unable to tolerate additional Fe  12/2/2020:  FS Hgb 12.9             Size of fetus inconsistent with dates in third trimester ICD-10-CM: O26.843  ICD-9-CM: 649.63  9/14/2020    Overview Addendum 12/2/2020  3:27 PM by Gerald Newby MD     9/14/2020:  Mian Pacheco 23 at 25 weeks, [address] next visit  10/12/2020: EFW 70%, AC 63%, THANH nl, vertex  12/2/2020:  EFW 66%, AC 73%, THANH 15.1 cm, vtx             Primigravida in third trimester ICD-10-CM: Z34.03  ICD-9-CM: V22.0  5/14/2020    Overview Addendum 11/23/2020  8:52 AM by Gerald Newby MD     EDC by 7 1/7 week  - irreg menses    7/16/2020: CF, SMA, Tetra neg  8/[id number]: Anatomy wnl    10/12/2020: Flu vaccine given today, TDAP recommended,                        Current Facility-Administered Medications   Medication Dose Route Frequency    docusate sodium (COLACE) capsule 100 mg  100 mg Oral BID    ibuprofen (MOTRIN) tablet 800 mg  800 mg Oral Q6H PRN    HYDROcodone-acetaminophen (NORCO) 5-325 mg per tablet 1-2 Tab  1-2 Tab Oral Q4H PRN    naloxone (NARCAN) injection 0.4 mg  0.4 mg IntraVENous PRN    simethicone (MYLICON) tablet 80 mg  80 mg Oral QID PRN    diphenhydrAMINE (BENADRYL) capsule 25-50 mg  25-50 mg Oral Q4H PRN    diph,Pertuss(AC),Tet Vac-PF (BOOSTRIX) suspension 0.5 mL  0.5 mL IntraMUSCular PRIOR TO DISCHARGE    zolpidem (AMBIEN) tablet 10 mg  10 mg Oral QHS PRN    hydrocortisone (ANUSOL-HC) 2.5 % rectal cream   PeriANAL QID    benzocaine-menthoL (DERMOPLAST) 20-0.5 % topical aerosol 1 Spray  1 Spray Topical PRN       OB History    Para Term  AB Living   1 1 1 0 0 1   SAB TAB Ectopic Molar Multiple Live Births   0 0 0 0 0 1      # Outcome Date GA Lbr Filippo/2nd Weight Sex Delivery Anes PTL Lv   1 Term 20 37w2d 22:51 / 05:06 6 lb 13.7 oz (3.11 kg) M Vag-Vacuum EPI N SHERYL      Complications: Shoulder Dystocia      Name: Juanita [place]: 9  Apgar5: 9       Assessment and Plan:      PPD 1:  Patient appears to be having uncomplicated post-partum course. Continue routine perineal care and maternal education. Plan discharge tomorrow if no problems occur.     PreE/GHTN, last 4 BPs wnl/SBPs 130s max. Hold off on Lasix/Procardia. Serial BPs, add Lasix/Procardia if her BPs start trending up. Breastfeeding strategies reviewed.      SIDs  precautions reviewed    ABO Group   Date Value Ref Range Status   2020 A  Final     Rh (D)   Date Value Ref Range Status   2020 Positive  Final     Comment:     Please note: Prior records for this patient's ABO / Rh type are not  available for additional verification.        Rubella Ab, IgG   Date Value Ref Range Status   05/14/2020 1.82 Immune >0.99 index Final     Comment:                                     Non-immune       <0.90                                  Equivocal  0.90 - 0.99                                  Immune           >0.99

## 2020-12-13 VITALS
HEART RATE: 79 BPM | TEMPERATURE: 98.7 F | DIASTOLIC BLOOD PRESSURE: 97 MMHG | WEIGHT: 171 LBS | OXYGEN SATURATION: 98 % | HEIGHT: 64 IN | RESPIRATION RATE: 17 BRPM | SYSTOLIC BLOOD PRESSURE: 146 MMHG | BODY MASS INDEX: 29.19 KG/M2

## 2020-12-13 PROCEDURE — 74011250637 HC RX REV CODE- 250/637: Performed by: OBSTETRICS & GYNECOLOGY

## 2020-12-13 RX ORDER — HYDROCODONE BITARTRATE AND ACETAMINOPHEN 5; 325 MG/1; MG/1
1 TABLET ORAL
Qty: 20 TAB | Refills: 0 | Status: SHIPPED | OUTPATIENT
Start: 2020-12-13 | End: 2020-12-20

## 2020-12-13 RX ORDER — HYDROCORTISONE 25 MG/G
CREAM TOPICAL 4 TIMES DAILY
Qty: 30 G | Refills: 3 | Status: SHIPPED | OUTPATIENT
Start: 2020-12-13 | End: 2021-06-29 | Stop reason: ALTCHOICE

## 2020-12-13 RX ORDER — NIFEDIPINE 30 MG/1
30 TABLET, EXTENDED RELEASE ORAL EVERY 12 HOURS
Qty: 60 TAB | Refills: 0 | Status: SHIPPED | OUTPATIENT
Start: 2020-12-13 | End: 2021-02-04

## 2020-12-13 RX ORDER — IBUPROFEN 800 MG/1
800 TABLET ORAL
Qty: 100 TAB | Refills: 2 | Status: SHIPPED | OUTPATIENT
Start: 2020-12-13

## 2020-12-13 RX ADMIN — DOCUSATE SODIUM 100 MG: 100 CAPSULE, LIQUID FILLED ORAL at 08:16

## 2020-12-13 RX ADMIN — HYDROCODONE BITARTRATE AND ACETAMINOPHEN 1 TABLET: 5; 325 TABLET ORAL at 01:57

## 2020-12-13 RX ADMIN — IBUPROFEN 800 MG: 800 TABLET ORAL at 05:05

## 2020-12-13 RX ADMIN — HYDROCORTISONE: 25 CREAM TOPICAL at 09:00

## 2020-12-13 NOTE — LACTATION NOTE
Mom and baby are going home today. Continue to offer the breast without restriction. Mom's milk should be fully in over the next few days. Reviewed engorgement precautions. Hand Expression has been demoed and written hand-out reviewed. As milk comes in baby will be more alert at the breast and swallows will be more obvious. Breasts may feel softer once baby has finished nursing. Baby should be back to birth weight by 3weeks of age. And then gain on average 1 oz per day for the next 2-3 months. Reviewed babies should be exclusively breastfeeding for the first 6 months and that breastfeeding should continue after introduction of appropriate complimentary foods after 6 months. Initial output should be at least 1 wet and 1 bowel movement for each day old baby is. By day 5-7 once milk is fully in baby will consistently have 6 or more soaking wet diapers and about 4 bowel movement. Some babies have a bowel movement with every feeding and some have 1-3 large bowel movements each day. Inadequate output may indicate inadequate feedings and should be reported to your Pediatrician. Bowel habits may change as baby gets older. Encouraged follow-up at Pediatrician in 1-2 days, no later than 1 week of age. Call [facility] for any questions as needed or to set up an OP visit. OP phone calls are returned within 24 hours. Community Breastfeeding Resource List given.

## 2020-12-13 NOTE — PROGRESS NOTES
Progress Note                               Patient: Jodi Back MRN: [id number]  SSN: [id number]    YOB: 2000  Age: 21 y.o. Sex: female      Postpartum Day Number 2    Subjective:     Patient doing well postpartum without significant complaints. Voiding without difficulty. Patient reports normal lochia. . Breastfeeding: Yes     Objective:     Patient Vitals for the past 18 hrs:   Temp Pulse Resp BP SpO2   20 1108 98.7 °F (37.1 °C) 79 17 (!) 146/97 98 %   20 0708 98.4 °F (36.9 °C) 97 16 130/74 96 %   20 0442 98.3 °F (36.8 °C) 88 16 (!) 144/73 99 %   20 0050  84 16 (!) 150/74    20 1950 98.3 °F (36.8 °C) 90 16 (!) 106/52 98 %        Temp (24hrs), Av.4 °F (36.9 °C), Min:98.3 °F (36.8 °C), Max:98.7 °F (37.1 °C)      Physical Exam:    Patient without distress. Abdomen: soft, nontender  Fundus: firm and non tender  Lower Extremities:  - Edema minimal    Lab/Data Review:  Blood Type:   Lab Results   Component Value Date/Time    ABO/Rh(D) A POSITIVE 12/10/2020 10:45 AM      Infant's Blood Type: Information for the patient's :  Chelsea Chavez [[id number]]     Lab Results   Component Value Date/Time    ABO/Rh(D) A POSITIVE 2020 03:01 PM        CBC:    Recent Labs     12/10/20  1045 20  1017 10/13/20  0924 20  0928   WBC 6.5 6.7 6.5 4.7   HGB 11.6* 11.7 10.9* 11.9   HCT 34.1* 33.7* 32.8* 35.1    233 203 261       CMP:   Recent Labs     12/10/20  1154 20  1017    140   K 3.5 3.6   * 105   CO2 25 20   AGAP 7  --    GLU 73 106*   BUN 3* 3*   CREA 0.59* 0.61   GFRAA >60 151   GFRNA >60 131   CA 9.0 8.8   ALB 2.5* 3.4*   TP 6.3 6.0   GLOB 3.8*  --    AGRAT 0.7* 1.3   ALT 35 37*       Recent Labs     20  1017   URICA 4.5   *       COAGS:  No results for input(s): APTT, PTP, INR, INREXT in the last 7224 hours.     Invalid input(s): PTTP, INRT    Recent Glucose Results: Recent Glucose Results:   Recent Labs 12/11/20  1516 12/11/20  1515 12/10/20  1154 12/09/20  1017   GLU  --   --  73 106*   GLUCPOC 70 66  --   --        Prenatal Labs:  No results found for: RUBELLAEXT, GRBSEXT, HBSAGEXT, HIVEXT, RPREXT, TPALEXT, GONNOEXT, CHLAMEXT, OVU37TAS, [id number]      Assessment and Plan:     Home today with procardia but not lasix    Dileep Espinosa MD  12:49 PM

## 2020-12-13 NOTE — PROGRESS NOTES
Care Management Interventions  PCP Verified by CM: Yes(No PCP. Pt reqested referral to [facility] for help establishing PCP. Referral made.  )  Discharge Location  Discharge Placement: Home  Spoke with pt and provided informational packet on  mood and anxiety disorders (resources/education). Also provided education on [facility] Home Visit and provided brochure. Pt is interested in program - referral will be made.

## 2020-12-13 NOTE — LACTATION NOTE
In to follow up with mom and infant prior to discharge to home. Mom very excited that infant had continued to latch and nurse throughout the night. Reviewed discharge information and answered mom and dad's questions. Instructed her to change to the 27 mm flanges when pumping and instructed her to take her breast pump kit home with her. Encouraged mom to follow up with our outpatient lactation consultant as needed.

## 2020-12-13 NOTE — DISCHARGE SUMMARY
Obstetrical Discharge Summary     Name: Colvin Kanner MRN: [id number]  SSN: [id number]    YOB: 2000  Age: 21 y.o. Sex: female      Allergies: Patient has no known allergies. Admit Date: 12/10/2020    Discharge Date: 2020     Admitting Physician: Madyson Hamilton MD     Attending Physician:  Lynda Doherty MD     * Admission Diagnoses: Primigravida in third trimester [Z34.03]; Gestational hypertension, third trimester [O13.3]; Pre-eclampsia [O14.90]    * Discharge Diagnoses:   Information for the patient's :  Molly Lugo [[id number]]   Delivery of a 6 lb 13.7 oz (3.11 kg) male infant via Vaginal, Vacuum (Extractor) on 2020 at 3:01 PM  by Maria D Durán. Apgars were 9  and 9 . Additional Diagnoses:   Hospital Problems as of 2020 Date Reviewed: 12/10/2020          Codes Class Noted - Resolved POA    Pre-eclampsia ICD-10-CM: O14.90  ICD-9-CM: 642.40  2020 - Present Unknown        * (Principal) Gestational hypertension, third trimester ICD-10-CM: O13.3  ICD-9-CM: 642.33  2020 - Present Yes    Overview Signed 2020  9:57 AM by Gerald Newby MD     2020:  Initial /90             Anemia affecting pregnancy in third trimester ICD-10-CM: O99.013  ICD-9-CM: 648.23, 285.9  10/14/2020 - Present Yes    Overview Addendum 2020  3:33 PM by Gerald Newby MD     Pt unable to tolerate additional Fe  2020:  FS Hgb 12.9             Primigravida in third trimester ICD-10-CM: Z34.03  ICD-9-CM: V22.0  2020 - Present Yes    Overview Addendum 2020  8:52 AM by Gerald Newby MD     EDC by 7 1/7 week US - irreg menses    2020: CF, SMA, Tetra neg  [id number]:   Anatomy wnl    10/12/2020: Flu vaccine given today, TDAP recommended,                   Lab Results   Component Value Date/Time    ABO/Rh(D) A POSITIVE 12/10/2020 10:45 AM      Immunization History   Administered Date(s) Administered    HPV 2013, 2013, 2014    Hep B Vaccine 2000, 2000, 2001    Influenza Vaccine (Quad) PF (>6 Mo Flulaval, Fluarix, and >3 Yrs Jaycob Salcedo [id number]) 10/12/2020    MMR 2001, 2004    Tdap 10/15/2018, 10/13/2020       * Procedures: vacuum assisted delivery with small vaginal tear    Korbel  Depression Scale  I have been able to laugh and see the funny side of things: As much as I always could  I have looked forward with enjoyment to things: As much as I ever did  I have blamed myself unnecessarily when things went wrong: Not very often  I have been anxious or worried for no good reason: Yes, sometimes  I have felt scared or panicky for no very good reason: No, not at all  Things have been getting on top of me: No, I have been coping as well as ever  I have been so unhappy that I have had difficulty sleeping: No, not at all  I have felt sad or miserable: No, not at all  I have been so unhappy that I have been crying: No, never  The thought of harming myself has occurred to me: Never  Total Score: 3    * Discharge Condition: good    * Hospital Course: admitted for preeclampsia. Induced. Postpartum did well. Elevated BP persisted. Started procardia at discharge    * Disposition: Home    Discharge Medications:   Current Discharge Medication List      START taking these medications    Details   HYDROcodone-acetaminophen (NORCO) 5-325 mg per tablet Take 1 Tab by mouth every four (4) hours as needed for Pain for up to 7 days. Max Daily Amount: 6 Tabs. Qty: 20 Tab, Refills: 0    Associated Diagnoses: Postoperative pain      hydrocortisone (ANUSOL-HC) 2.5 % rectal cream Insert  into rectum four (4) times daily. Qty: 30 g, Refills: 3      ibuprofen (MOTRIN) 800 mg tablet Take 1 Tab by mouth every six (6) hours as needed for Pain. Qty: 100 Tab, Refills: 2      NIFEdipine ER (Procardia XL) 30 mg ER tablet Take 1 Tab by mouth every twelve (12) hours.  Indications: high blood pressure  Qty: 60 Tab, Refills: 0         CONTINUE these medications which have NOT CHANGED    Details   pantoprazole (PROTONIX) 20 mg tablet Take 1 Tab by mouth daily. Qty: 30 Tab, Refills: 0      docusate sodium (COLACE) 100 mg capsule Take 1 Cap by mouth two (2) times a day for 90 days. Qty: 60 Cap, Refills: 2      PNV MI.88/CJFUZOL fum/folic ac (PRENATAL PO) Take  by mouth. ondansetron (ZOFRAN ODT) 4 mg disintegrating tablet Take 1 Tab by mouth every eight (8) hours as needed for Nausea or Vomiting. Qty: 30 Tab, Refills: 0             * Follow-up Care/Patient Instructions:   Activity: Activity as tolerated  Diet: Regular Diet  Wound Care: As directed    Follow-up Information     Follow up With Specialties Details Why Contact Info    None    None ([phone]) Patient stated that they have no PCP             Pierce Payne MD  12:54 PM

## 2020-12-13 NOTE — DISCHARGE INSTRUCTIONS
Patient Education   Patient Education        Vaginal Childbirth: Care Instructions  Overview     Vaginal birth means delivering a baby through the birth canal (vagina). During labor, the uterus tightens (contracts) regularly to thin and open the cervix and to push the baby out through the birth canal.  Your body will slowly heal in the next few weeks. It's easy to get too tired and overwhelmed during the first weeks after your baby is born. Changes in your hormones can shift your mood without warning. You may find it hard to meet the extra demands on your energy and time. Take it easy on yourself. Follow-up care is a key part of your treatment and safety. Be sure to make and go to all appointments, and call your doctor if you are having problems. It's also a good idea to know your test results and keep a list of the medicines you take. How can you care for yourself at home? Vaginal bleeding and cramps  · After delivery, you will have a bloody discharge from your vagina. This will turn pink within a week and then white or yellow after about 10 days. It may last for 2 to 4 weeks or longer, until the uterus has healed. Use pads instead of tampons until you stop bleeding. · Don't worry if you pass some blood clots, as long as they are smaller than a golf ball. If you have a tear or stitches in your vaginal area, change the pad at least every 4 hours. This will help prevent soreness and infection. · You may have cramps for the first few days after childbirth. These are normal and occur as the uterus shrinks to normal size. Take an over-the-counter pain medicine, such as acetaminophen (Tylenol), ibuprofen (Advil, Motrin), or naproxen (Aleve), for cramps. Read and follow all instructions on the label. Do not take aspirin, because it can cause more bleeding. · Do not take two or more pain medicines at the same time unless the doctor told you to. Many pain medicines have acetaminophen, which is Tylenol.  Too much acetaminophen (Tylenol) can be harmful. Stitches  · If you have stitches, they will dissolve on their own and don't need to be removed. Follow your doctor's instructions for cleaning the stitched area. · Put ice or a cold pack on your painful area for 10 to 20 minutes at a time, several times a day, for the first few days. Put a thin cloth between the ice and your skin. · Sit in a few inches of warm water (sitz bath) 3 times a day and after bowel movements. The warm water helps with pain and itching. If you don't have a tub, a warm shower might help. Breast fullness  · Your breasts may overfill (engorge) in the first few days after delivery. To help milk flow and to relieve pain, warm your breasts in the shower or by using warm, moist towels before nursing. · If you aren't nursing, don't put warmth on your breasts or touch your breasts. Wear a tight bra or sports bra and use ice until the fullness goes away. This usually takes 2 to 3 days. · Put ice or a cold pack on your breast after nursing to reduce swelling and pain. Put a thin cloth between the ice and your skin. Activity  · Eat a balanced diet. Don't try to lose weight by cutting calories. Keep taking your prenatal vitamins, or take a multivitamin. · Get as much rest as you can. Try to take naps when your baby sleeps during the day. · Get some exercise every day. But don't do any heavy exercise until your doctor says it is okay. · Wait until you are healed (about 4 to 6 weeks) before you have sexual intercourse. Your doctor will tell you when it is okay to have sex. · Talk to your doctor about birth control. You can get pregnant even before your period returns. Also, you can get pregnant while you are breastfeeding. Mental health  · It's normal to have some sadness, anxiety, sleeplessness, and mood swings after you go home.  If you feel upset or hopeless for more than a few days or are having trouble doing the things you need to do, talk to your doctor. Constipation and hemorrhoids  · Drink plenty of fluids, enough so that your urine is light yellow or clear like water. If you have kidney, heart, or liver disease and have to limit fluids, talk with your doctor before you increase the amount of fluids you drink. · Eat plenty of fiber each day. Have a bran muffin or bran cereal for breakfast. Try eating a piece of fruit for a mid-afternoon snack. · For painful, itchy hemorrhoids, put ice or a cold pack on the area several times a day for 10 minutes at a time. Follow this by putting a warm compress on the area for another 10 to 20 minutes or by sitting in a shallow, warm bath. When should you call for help? Call  911 anytime you think you may need emergency care. For example, call if:    · You have thoughts of harming yourself, your baby, or another person.     · You passed out (lost consciousness).     · You have chest pain, are short of breath, or cough up blood.     · You have a seizure. Call your doctor now or seek immediate medical care if:    · You have severe vaginal bleeding.     · You are dizzy or lightheaded, or you feel like you may faint.     · You have a fever.     · You have new or more pain in your belly or pelvis.     · You have symptoms of a blood clot in your leg (called a deep vein thrombosis), such as:  ? Pain in the calf, back of the knee, thigh, or groin. ? Redness and swelling in your leg or groin.     · You have signs of preeclampsia, such as:  ? Sudden swelling of your face, hands, or feet. ? New vision problems (such as dimness, blurring, or seeing spots). ? A severe headache. Watch closely for changes in your health, and be sure to contact your doctor if:    · Your vaginal bleeding seems to be getting heavier.     · You have new or worse vaginal discharge.     · You feel sad, anxious, or hopeless for more than a few days.     · You do not get better as expected. Where can you learn more?   Go to http://www.gray.com/  Enter L370 in the search box to learn more about \"Vaginal Childbirth: Care Instructions. \"  Current as of: 2020               Content Version: 12.6  © 0995-7357 [employer]. Care instructions adapted under license by [employer] (which disclaims liability or warranty for this information). If you have questions about a medical condition or this instruction, always ask your healthcare professional. [address] any warranty or liability for your use of this information. After Your Delivery (the Postpartum Period): Care Instructions  Your Care Instructions     Congratulations on the birth of your baby. Like pregnancy, the  period can be a time of excitement, mary, and exhaustion. You may look at your wondrous little baby and feel happy. You may also be overwhelmed by your new sleep hours and new responsibilities. At first, babies often sleep during the days and are awake at night. They do not have a pattern or routine. They may make sudden gasps, jerk themselves awake, or look like they have crossed eyes. These are all normal, and they may even make you smile. In these first weeks after delivery, try to take good care of yourself. It may take 4 to 6 weeks to feel like yourself again, and possibly longer if you had a  birth. You will likely feel very tired for several weeks. Your days will be full of ups and downs, but lots of mary as well. Follow-up care is a key part of your treatment and safety. Be sure to make and go to all appointments, and call your doctor if you are having problems. It's also a good idea to know your test results and keep a list of the medicines you take. How can you care for yourself at home? Take care of your body after delivery  · Use pads instead of tampons for the bloody flow that may last as long as 2 weeks.   · Ease cramps with ibuprofen (Advil, Motrin). · Ease soreness of hemorrhoids and the area between your vagina and rectum with ice compresses or witch hazel pads. · Ease constipation by drinking lots of fluid and eating high-fiber foods. Ask your doctor about over-the-counter stool softeners. · Cleanse yourself with a gentle squeeze of warm water from a bottle instead of wiping with toilet paper. · Take a sitz bath in warm water several times a day. · Wear a good nursing bra. Ease sore and swollen breasts with warm, wet washcloths. · If you are not breastfeeding, use ice rather than heat for breast soreness. · Your period may not start for several months if you are breastfeeding. You may bleed more, and longer at first, than you did before you got pregnant. · Wait until you are healed (about 4 to 6 weeks) before you have sexual intercourse. Your doctor will tell you when it is okay to have sex. · Try not to travel with your baby for 5 or 6 weeks. If you take a long car trip, make frequent stops to walk around and stretch. Avoid exhaustion  · Rest every day. Try to nap when your baby naps. · Ask another adult to be with you for a few days after delivery. · Plan for  if you have other children. · Stay flexible so you can eat at odd hours and sleep when you need to. Both you and your baby are making new schedules. · Plan small trips to get out of the house. Change can make you feel less tired. · Ask for help with housework, cooking, and shopping. Remind yourself that your job is to care for your baby. Know about help for postpartum depression  · \"Baby blues\" are common for the first 1 to 2 weeks after birth. You may cry or feel sad or irritable for no reason. · Rest whenever you can. Being tired makes it harder to handle your emotions. · Go for walks with your baby. · Talk to your partner, friends, and family about your feelings.   · If your symptoms last for more than a few weeks, or if you feel very depressed, ask your doctor for help. · Postpartum depression can be treated. Support groups and counseling can help. Sometimes medicine can also help. Stay healthy  · Eat healthy foods so you have more energy and lose extra baby pounds. · If you breastfeed, avoid drugs. If you quit smoking during pregnancy, try to stay smoke-free. If you choose to have a drink now and then, have only one drink, and limit the number of occasions that you have a drink. Wait to breastfeed at least 2 hours after you have a drink to reduce the amount of alcohol the baby may get in the milk. · Start daily exercise after 4 to 6 weeks, but rest when you feel tired. · Learn exercises to tone your belly. Do Kegel exercises to regain strength in your pelvic muscles. You can do these exercises while you stand or sit. ? Squeeze the same muscles you would use to stop your urine. Your belly and thighs should not move. ? Hold the squeeze for 3 seconds, and then relax for 3 seconds. ? Start with 3 seconds. Then add 1 second each week until you are able to squeeze for 10 seconds. ? Repeat the exercise 10 to 15 times for each session. Do three or more sessions each day. · Find a class for new mothers and new babies that has an exercise time. · If you had a  birth, give yourself a bit more time before you exercise, and be careful. When should you call for help? Call  911 anytime you think you may need emergency care. For example, call if:    · You have thoughts of harming yourself, your baby, or another person.     · You passed out (lost consciousness).     · You have chest pain, are short of breath, or cough up blood.     · You have a seizure. Call your doctor now or seek immediate medical care if:    · You have severe vaginal bleeding.  This means you are passing blood clots and soaking through a pad each hour for 2 or more hours.     · You are dizzy or lightheaded, or you feel like you may faint.     · You have a fever.     · You have new or more belly pain.     · You have signs of a blood clot in your leg (called a deep vein thrombosis), such as:  ? Pain in the calf, back of the knee, thigh, or groin. ? Redness and swelling in your leg or groin.     · You have signs of preeclampsia, such as:  ? Sudden swelling of your face, hands, or feet. ? New vision problems (such as dimness, blurring, or seeing spots). ? A severe headache. Watch closely for changes in your health, and be sure to contact your doctor if:    · Your vaginal bleeding seems to be getting heavier.     · You have new or worse vaginal discharge.     · You feel sad, anxious, or hopeless for more than a few days.     · You do not get better as expected. Where can you learn more? Go to http://www.syed.com/  Enter A461 in the search box to learn more about \"After Your Delivery (the Postpartum Period): Care Instructions. \"  Current as of: February 11, 2020               Content Version: 12.6  © 2006-2020 [employer]. Care instructions adapted under license by [employer] (which disclaims liability or warranty for this information). If you have questions about a medical condition or this instruction, always ask your healthcare professional. [address] any warranty or liability for your use of this information.

## 2020-12-13 NOTE — LACTATION NOTE
Called back to room. Mom had just pumped and expressed 18 ml colostrum and was wondering if she should try to feed it to infant at this time. Infant asleep in crib past circumcision. Attempted to wak infant and he was asleep showing no hunger cues. Instructed mom to attempt to allow infant to suck on her finger. Infant not interested. Instructed mom to \"save\" the colostrum and take it home to store. Mom had no other concerns.

## 2021-02-04 PROBLEM — Z30.017 NEXPLANON INSERTION: Status: ACTIVE | Noted: 2021-02-04

## 2021-06-29 PROBLEM — Z00.8 ENCOUNTER FOR SPECIALIZED MEDICAL EXAMINATION: Status: ACTIVE | Noted: 2021-06-29

## 2022-03-19 PROBLEM — Z00.8 ENCOUNTER FOR SPECIALIZED MEDICAL EXAMINATION: Status: ACTIVE | Noted: 2021-06-29

## 2022-03-20 PROBLEM — Z30.017 NEXPLANON INSERTION: Status: ACTIVE | Noted: 2021-02-04

## 2022-08-16 ENCOUNTER — OFFICE VISIT (OUTPATIENT)
Dept: OBGYN CLINIC | Age: 22
End: 2022-08-16
Payer: COMMERCIAL

## 2022-08-16 VITALS
BODY MASS INDEX: 19.81 KG/M2 | HEIGHT: 64 IN | SYSTOLIC BLOOD PRESSURE: 118 MMHG | WEIGHT: 116 LBS | DIASTOLIC BLOOD PRESSURE: 78 MMHG

## 2022-08-16 DIAGNOSIS — N92.6 IRREGULAR MENSES: Primary | ICD-10-CM

## 2022-08-16 DIAGNOSIS — N92.6 IRREGULAR MENSES: ICD-10-CM

## 2022-08-16 LAB
ERYTHROCYTE [DISTWIDTH] IN BLOOD BY AUTOMATED COUNT: 12.3 % (ref 11.9–14.6)
HCG SERPL-ACNC: <1 MIU/ML (ref 0–6)
HCT VFR BLD AUTO: 38.8 % (ref 35.8–46.3)
HGB BLD-MCNC: 12.5 G/DL (ref 11.7–15.4)
MCH RBC QN AUTO: 30.3 PG (ref 26.1–32.9)
MCHC RBC AUTO-ENTMCNC: 32.2 G/DL (ref 31.4–35)
MCV RBC AUTO: 94.2 FL (ref 79.6–97.8)
NRBC # BLD: 0 K/UL (ref 0–0.2)
PLATELET # BLD AUTO: 216 K/UL (ref 150–450)
PMV BLD AUTO: 11.3 FL (ref 9.4–12.3)
RBC # BLD AUTO: 4.12 M/UL (ref 4.05–5.2)
TSH W FREE THYROID IF ABNORMAL: 0.86 UIU/ML (ref 0.36–3.74)
WBC # BLD AUTO: 2.8 K/UL (ref 4.3–11.1)

## 2022-08-16 PROCEDURE — 99214 OFFICE O/P EST MOD 30 MIN: CPT | Performed by: NURSE PRACTITIONER

## 2022-08-16 RX ORDER — NORETHINDRONE ACETATE AND ETHINYL ESTRADIOL 1MG-20(21)
1 KIT ORAL DAILY
Qty: 1 PACKET | Refills: 11 | Status: SHIPPED | OUTPATIENT
Start: 2022-08-16 | End: 2022-09-28 | Stop reason: ALTCHOICE

## 2022-08-16 NOTE — PROGRESS NOTES
Patient comes in today for bleeding with Nexplanon. Patient states she has had 10 migraines in the last month. Patient states she noticed more migraines but thought it was just stress from switching jobs. Patient states she has been bleeding for the past month. Patient states she had her Nexplanon 02/2021. Patient states her period was regular but the past month. LAST PAP:  06/29/2021, Negative    LAST MAMMO:  Never    LMP:  No LMP recorded.     BIRTH CONTROL:  Nexplanon    TOBACCO USE:  No    FAMILY HISTORY OF:   Breast Cancer:  No   Ovarian Cancer:  No   Uterine Cancer:  No   Colon Cancer:  No    Vitals:    08/16/22 1015   Weight: 116 lb (52.6 kg)   Height: 5' 4\" (1.626 m)        Ester Cruz MA  08/16/22  10:17 AM

## 2022-08-17 ENCOUNTER — TELEPHONE (OUTPATIENT)
Dept: OBGYN CLINIC | Age: 22
End: 2022-08-17

## 2022-08-17 DIAGNOSIS — D72.819 LEUKOPENIA, UNSPECIFIED TYPE: ICD-10-CM

## 2022-08-17 DIAGNOSIS — Z76.89 ENCOUNTER TO ESTABLISH CARE: Primary | ICD-10-CM

## 2022-08-17 PROBLEM — N92.6 IRREGULAR MENSES: Status: ACTIVE | Noted: 2022-08-17

## 2022-08-17 NOTE — ASSESSMENT & PLAN NOTE
Diff Dx: infection, ovarian cyst, nl variant with nexplanon  TSH, CBC, and HCG drawn  Will start combined OCP to stop bleeding, and RTO for US

## 2022-08-17 NOTE — TELEPHONE ENCOUNTER
Below message reviewed with pt. Verbalizes understanding.  Requests referral to PCP to establish care

## 2022-08-17 NOTE — PROGRESS NOTES
I have reviewed the patient's visit today including history, exam and assessment by RILEY Langston. I agree with treatment/plan as above.     Juan Horton MD  7:58 AM  08/17/22

## 2022-08-17 NOTE — TELEPHONE ENCOUNTER
Please let pt know her wbcs were low on her cbc. We need to recheck at her next visit. If again low, I recommend followup with her PCP.     Please add lab visit onto her appt 8/30/2022

## 2022-08-17 NOTE — PROGRESS NOTES
Tuyet Berg is a 25 y.o. No obstetric history on file. who is here today to discuss irregular vaginal bleeding. Pt has had nexplanon for > 1 year. Normally gets menses each month, very normal. But when menses started 7/15/22 and it hasn't stopped. Alternates between light and heavy. Also with migraines the past month. Was under a lot of stress with switching employers. Mild cramping, no severe pelvic pain. Denies vaginal discharge, itching odor or dysuria. Patient's last menstrual period was 07/15/2022 (approximate).                   Past Medical History:   Diagnosis Date    Anemia     Asthma     EIA, as child    Complication of anesthesia     irregular heart rhythm    Essential hypertension     Gestational hypertension     Irregular heart beat     during surgery on knee    Migraine     Primigravida in first trimester 5/14/2020    EDC by 7 1/7 week US - irreg menses       Past Surgical History:   Procedure Laterality Date    ORTHOPEDIC SURGERY Left     ACL reconstruction    TONSILLECTOMY         Family History   Problem Relation Age of Onset    Seizures Maternal Grandmother     Hypertension Maternal Grandmother     Diabetes Maternal Aunt     Hypertension Maternal Aunt     Uterine Cancer Neg Hx     Ovarian Cancer Neg Hx     Colon Cancer Neg Hx     Breast Cancer Neg Hx        Social History     Socioeconomic History    Marital status: Single     Spouse name: Not on file    Number of children: Not on file    Years of education: Not on file    Highest education level: Not on file   Occupational History    Not on file   Tobacco Use    Smoking status: Never    Smokeless tobacco: Never   Substance and Sexual Activity    Alcohol use: Not Currently    Drug use: Never    Sexual activity: Not on file   Other Topics Concern    Not on file   Social History Narrative    Lives with grandmother  Abuse: Feels safe at home, no history of physical abuse, no history of sexual abuse       Social Determinants of Health Financial Resource Strain: Not on file   Food Insecurity: Not on file   Transportation Needs: Not on file   Physical Activity: Not on file   Stress: Not on file   Social Connections: Not on file   Intimate Partner Violence: Not on file   Housing Stability: Not on file           Objective:    Vitals:    08/16/22 1015   BP: 118/78   Site: Left Upper Arm   Position: Sitting   Weight: 116 lb (52.6 kg)   Height: 5' 4\" (1.626 m)         Constitutional:  well-developed, well-nourished, and in no distress. Mental: Alert and awake. Oriented to person/place/time. Able to follow commands    Eyes: EOM nl, Sclera nl, Ocular Discharge not visualized    HENT: Normocephalic and atraumatic. Mouth/Throat: Mucous membranes are moist    External Ears: nl    Neck: Normal range of motion. No masses visualized       Pulmonary: Effort normal. No visualized signs of difficulty breathing or respiratory distress    Pelvic Exam:       External: normal female genitalia without lesions or masses       Vagina: normal without lesions or masses,  light VB noted      Cervix: normal without lesions or masses       Adnexa: normal bimanual exam without masses or fullness       Uterus: uterus is normal size, shape, consistency and nontender      Musculoskeletal: Normal range of motion. Normal gait with no signs of ataxia     Neurological: No Facial Asymmetry (Cranial nerve 7 motor function) No gaze palsy    Skin: No significant exanthematous lesions or discoloration noted on facial skin      Psych: Normal affect.  No hallucinations              Assessment/Plan            Patient Active Problem List    Diagnosis Date Noted    Irregular menses 08/17/2022     Priority: Medium     Assessment & Plan Note:     Diff Dx: infection, ovarian cyst, nl variant with nexplanon  TSH, CBC, and HCG drawn  Will start combined OCP to stop bleeding, and RTO for US        Encounter for specialized medical examination 06/29/2021     Overview Note:     Pap with reflex done 6/29/21        Nexplanon insertion 02/04/2021     Overview Note:     Inserted 2/4/21           Problem List Items Addressed This Visit          Other    Irregular menses - Primary     Diff Dx: infection, ovarian cyst, nl variant with nexplanon  TSH, CBC, and HCG drawn  Will start combined OCP to stop bleeding, and RTO for US           Relevant Orders    Nuswab Vaginitis Plus (VG+)    CBC (Completed)    TSH with Reflex (Completed)    HCG, Quantitative, Pregnancy (Completed)       Orders Placed This Encounter   Procedures    Nuswab Vaginitis Plus (VG+)    CBC    TSH with Reflex    HCG, Quantitative, Pregnancy       Outpatient Encounter Medications as of 8/16/2022   Medication Sig Dispense Refill    etonogestrel (NEXPLANON) 68 MG implant Inject 68 mg into the skin once      norethindrone-ethinyl estradiol (LOESTRIN FE 1/20) 1-20 MG-MCG per tablet Take 1 tablet by mouth in the morning. 1 packet 11    [DISCONTINUED] ibuprofen (ADVIL;MOTRIN) 800 MG tablet Take 800 mg by mouth every 6 hours as needed       No facility-administered encounter medications on file as of 8/16/2022.                Cristi Ordaz, NP, APRN - CNP 08/17/22 7:44 AM

## 2022-08-18 ENCOUNTER — TELEPHONE (OUTPATIENT)
Dept: OBGYN CLINIC | Age: 22
End: 2022-08-18

## 2022-08-18 NOTE — TELEPHONE ENCOUNTER
Patient LM stating she was freezing cold, fingers were blue and she had a headache. Patient was crying in the message. I called the patient to let her know that she needed to go to the ER.  She stated she was at [address] [place] [address].

## 2022-08-20 LAB
A VAGINAE DNA VAG QL NAA+PROBE: ABNORMAL SCORE
BVAB2 DNA VAG QL NAA+PROBE: ABNORMAL SCORE
C ALBICANS DNA VAG QL NAA+PROBE: NEGATIVE
C GLABRATA DNA VAG QL NAA+PROBE: NEGATIVE
C TRACH RRNA SPEC QL NAA+PROBE: NEGATIVE
MEGA1 DNA VAG QL NAA+PROBE: ABNORMAL SCORE
N GONORRHOEA RRNA SPEC QL NAA+PROBE: NEGATIVE
SPECIMEN SOURCE: ABNORMAL
T VAGINALIS RRNA SPEC QL NAA+PROBE: NEGATIVE

## 2022-08-22 ENCOUNTER — TELEPHONE (OUTPATIENT)
Dept: OBGYN CLINIC | Age: 22
End: 2022-08-22

## 2022-08-22 RX ORDER — METRONIDAZOLE 500 MG/1
500 TABLET ORAL 2 TIMES DAILY
Qty: 14 TABLET | Refills: 0 | Status: SHIPPED | OUTPATIENT
Start: 2022-08-22 | End: 2022-08-29

## 2022-08-24 ASSESSMENT — ENCOUNTER SYMPTOMS
DIARRHEA: 0
VOMITING: 0
COUGH: 0
NAUSEA: 0
SHORTNESS OF BREATH: 0
ABDOMINAL PAIN: 0

## 2022-08-24 NOTE — PROGRESS NOTES
[address], DO  [address] [facility], [place] [address], [address]  [phone]        ASSESSMENT AND PLAN    Problem List Items Addressed This Visit          Digestive    Fever blister      Patient with intermittent fever blisters over the last couple years, does not remember having these in the past.  Discussed with patient that fever blisters develop after exposure from herpes virus, though unable to tell patient how long ago she was exposed. Will prescribe Valtrex to take as needed. Nervous and Auditory    Intractable migraine with aura without status migrainosus      Patient with worsening migraines over the last month, believed to be due to her Nexplanon. Has an appointment in 4 days to have this removed. Notes that Excedrin is no longer helping with her migraines. Given Ubrelvy samples to see how this works. Discussed use of caffeine and hydration. We will recheck in 1 month. Other    Encounter to establish care with new doctor - Primary        The diagnoses and plan were discussed with the patient, who verbalizes understanding and agrees with plan. All questions answered. Chief Complaint    Chief Complaint   Patient presents with    Establish Care    Migraine     Pt states the obgyn told her she gets migrans because of her nexplanon and is going to have it removed but would like something to help while she waits for that appointment         HISTORY OF PRESENT ILLNESS    25 y.o. female presents today to establish care with a new primary care provider. Has not seen a PCP in awhile. States that she has been having problems with migraines for awhile. States that she had a Nexplanon for birth control about 5 years ago. States that she had her little boy two years ago and put this Nexplanon in last year. States that for the last month she's had abnormal vaginal bleeding and migraine headaches.   States that they put her on birth control pills and her bleeding has improved. Notes that she continues to have migraines every few days. Notes that she has had migraines since she was in high school but states that they were not severe until the last month. Has had about 10-15 migraines over the last month. States that she used to take Excedrin for migraines. States that she gets nausea with vomiting, light sensitivity and sound sensitivity if she doesn't get medicine in early enough. Denies known family history of migraines. Notes that she is no longer getting relief from Excedrin. States that they are so bad she cannot go to work. Notes that her migraines are on the left side of her temporal and parietal regions. Denies facial droop. States that she works at [employer] in [facility] [employer] [facility] [employer]. Also has started developing fever blisters after she had her son. States that she has used Abreva, which helps.     PAST MEDICAL HISTORY    Past Medical History:   Diagnosis Date    Anemia     Asthma     EIA, as child    Complication of anesthesia     irregular heart rhythm    Essential hypertension     Gestational hypertension     Intractable migraine with aura without status migrainosus 8/26/2022    Irregular heart beat     during surgery on knee    Migraine     Primigravida in first trimester 5/14/2020    EDC by 7 1/7 week US - irreg menses       PAST SURGICAL HISTORY    Past Surgical History:   Procedure Laterality Date    ORTHOPEDIC SURGERY Left     ACL reconstruction    TONSILLECTOMY         FAMILY HISTORY    Family History   Problem Relation Age of Onset    Seizures Maternal Grandmother     Hypertension Maternal Grandmother     Diabetes Maternal Aunt     Hypertension Maternal Aunt     Uterine Cancer Neg Hx     Ovarian Cancer Neg Hx     Colon Cancer Neg Hx     Breast Cancer Neg Hx        SOCIAL HISTORY    Social History     Socioeconomic History    Marital status: Single     Spouse name: None    Number of children: None    Years of education: None    Highest education level: None   Tobacco Use    Smoking status: Never    Smokeless tobacco: Never   Substance and Sexual Activity    Alcohol use: Not Currently     Comment: occ    Drug use: Never    Sexual activity: Yes     Partners: Male     Birth control/protection: Implant   Social History Narrative    Lives with grandmother  Abuse: Feels safe at home, no history of physical abuse, no history of sexual abuse         MEDICATIONS      Current Outpatient Medications:     valACYclovir (VALTREX) 1 g tablet, Take 1 tablet by mouth 2 times daily, Disp: 8 tablet, Rfl: 0    metroNIDAZOLE (FLAGYL) 500 MG tablet, Take 1 tablet by mouth 2 times daily for 7 days, Disp: 14 tablet, Rfl: 0    etonogestrel (NEXPLANON) 68 MG implant, Inject 68 mg into the skin once, Disp: , Rfl:     norethindrone-ethinyl estradiol (LOESTRIN FE 1/20) 1-20 MG-MCG per tablet, Take 1 tablet by mouth in the morning., Disp: 1 packet, Rfl: 11    ALLERGIES / INTOLERANCES    No Known Allergies    REVIEW OF SYSTEMS    Review of Systems   Constitutional:  Negative for fever. HENT:  Negative for congestion. Respiratory:  Negative for cough and shortness of breath. Cardiovascular:  Negative for chest pain. Gastrointestinal:  Negative for abdominal pain, diarrhea, nausea and vomiting. Neurological:  Positive for headaches. Psychiatric/Behavioral:  Negative for dysphoric mood. PHYSICAL EXAMINATION    Vitals:    08/26/22 0922   BP: (!) 106/56   Pulse: 78   Resp: 12   SpO2: 98%       Physical Exam  Vitals and nursing note reviewed. Constitutional:       General: She is not in acute distress. Appearance: Normal appearance. HENT:      Head: Normocephalic and atraumatic. Right Ear: External ear normal.      Left Ear: External ear normal.      Nose: Nose normal.      Mouth/Throat:      Lips: Lesions (fever blister noted bottom right lip) present.    Eyes:      Extraocular Movements: Extraocular movements intact. Pupils: Pupils are equal, round, and reactive to light. Cardiovascular:      Rate and Rhythm: Normal rate and regular rhythm. Heart sounds: Normal heart sounds. No murmur heard. Pulmonary:      Effort: Pulmonary effort is normal. No respiratory distress. Breath sounds: Normal breath sounds. Abdominal:      General: Bowel sounds are normal.      Palpations: Abdomen is soft. Tenderness: There is no abdominal tenderness. There is no right CVA tenderness or left CVA tenderness. Musculoskeletal:         General: Normal range of motion. Cervical back: Normal range of motion. Skin:     General: Skin is warm. Findings: No rash. Neurological:      General: No focal deficit present. Mental Status: She is alert. Cranial Nerves: Cranial nerves are intact. Sensory: Sensation is intact. Motor: Motor function is intact. Coordination: Coordination is intact. Gait: Gait is intact.    Psychiatric:         Mood and Affect: Mood normal.         Behavior: Behavior normal.         PERTINENT LABS AND IMAGING    CBC auto differential  Order: [id number]   Ref Range & Units 8/18/22 1546   WBC 3.7 - 10.6 x 10*3/uL 4.7    RBC 3.57 - 4.97 x 10*6/uL 4.15    HGB 11 - 14.9 g/dL 12.6    HCT 32.6 - 43.4 % 37.2    MCV 80.1 - 98.4 fL 89.5    MCH 26.9 - 34.1 pg 30.4    MCHC 32.9 - 35.4 g/dL 34.0    RDW 11.8 - 15.2 % 12.7    Platelets 440 - 545 x 10*3/uL 209    Auto Neutrophil Percent 43.4 - 75.7 % 67.3    Auto Lymphocyte Percent 15.7 - 45.3 % 27.4    Auto Monocyte Percent 3.5 - 11.1 % 4.8    Auto Eosinophil Percent 0 - 5 % 0.1    Auto Basophil  0 - 1.3 % 0.3    Auto Absolute Neutrophil 1 - 8 x 10*3/uL 3.2    Auto Absolute Lymphocyte 1 - 3.4 x 10*3/uL 1.3    Auto Absolute Monocyte 0.2 - 0.9 x 10*3/uL 0.2    Auto Absolute Eosinophil 0 - 0.4 x 10*3/uL 0.0    Auto Absolute Basophil 0 - 0.2 x 10*3/uL 0.0    Auto Nucleated Rbc Absolute 0 - 0 x 10*3/uL 0.0 nRBC 0 - 0 /100 WBC 0.0      Pregnancy, urine  Order: [id number]   Ref Range & Units 8/18/22 1541   URINE PREGNANCY TEST Negative Negative    Contains abnormal data Basic Metabolic Panel  Order: [id number]   Ref Range & Units 8/18/22 1546   Sodium 133 - 146 mmol/L 136    Potassium 3.5 - 5.2 mmol/L 3.2 Low     Chloride 100 - 111 mmol/L 100    Carbon Dioxide 23 - 32.6 mmol/L 25.7    Anion Gap 6 - 13 mmol/L 10    Osmolality Calculation 271 - 318 mOsm/kg 269.93 Low     Urea Nitrogen 7 - 25 mg/dL 8    Creatinine 0.47 - 1.35 mg/dL 0.60    BUN/Creat Ratio 8 - 20 NULL 13.33    eGFR Do Not Use the lab's eGFR calculation for medication dose adjustments per Pharmacy.  mL/min/1.73m*2 130.3    Urinalysis with microscopic  Order: [id number]   Ref Range & Units 8/18/22 1541   URINE COLOR Straw, Yellow Yellow    URINE APPEARANCE Clear, Slightly cloudy Hazy Abnormal     URINE SPECIFIC GRAVITY 1.005, 1.006, 1.007, 1.008, 1.009, 1.010, 1.011, 1.012, 1.013, 1.014, 1.015, 1.016, 1.017, 1.018, 1.019, 1.020, 1.021, 1.022, 1.023, 1.024, 1.025, 1.026, 1.027, 1.028, 1.029, 1.030 1.024    USP GRAV CONFIRM BY TS METER     URINE PH 5 - 8 6.0    URINE PROTEIN Negative mg/dL Negative    URINE GLUCOSE Negative mg/dL Negative    URINE KETONES Negative mg/dL 20  Abnormal     URINE BILIRUBIN Negative Negative    URINE BLOOD Negative, Trace Intact, +2, 3+ Moderate Abnormal     Urine Leukocyte  Negative, 1+, +2 Negative    URINE NITRITE Negative Negative    URINE UROBILINOGEN Negative mg/dL Negative    URINE WBC 0-8/hpf /hpf /hpf <1 Abnormal     Urine RBC 0-3/hpf /hpf /hpf 1     Urine Squamous Epithelial Cells NONE SEEN /lpf 10     Urine Mucous-Iris None Seen /lpf Rare Abnormal     AMOUNT OF URINARY  CAST     Urine  Cast       ABO/Rh  Order: [id number]  Component 8/18/22 [address] [place] [address] [address], DO  9:53 AM  08/26/22

## 2022-08-26 ENCOUNTER — OFFICE VISIT (OUTPATIENT)
Dept: OBGYN CLINIC | Age: 22
End: 2022-08-26
Payer: COMMERCIAL

## 2022-08-26 ENCOUNTER — OFFICE VISIT (OUTPATIENT)
Dept: PRIMARY CARE CLINIC | Facility: CLINIC | Age: 22
End: 2022-08-26
Payer: COMMERCIAL

## 2022-08-26 VITALS
SYSTOLIC BLOOD PRESSURE: 122 MMHG | HEIGHT: 64 IN | DIASTOLIC BLOOD PRESSURE: 72 MMHG | BODY MASS INDEX: 19.81 KG/M2 | WEIGHT: 116 LBS

## 2022-08-26 VITALS
SYSTOLIC BLOOD PRESSURE: 106 MMHG | RESPIRATION RATE: 12 BRPM | HEART RATE: 78 BPM | BODY MASS INDEX: 19.87 KG/M2 | WEIGHT: 116.4 LBS | DIASTOLIC BLOOD PRESSURE: 56 MMHG | OXYGEN SATURATION: 98 % | HEIGHT: 64 IN

## 2022-08-26 DIAGNOSIS — Z76.89 ENCOUNTER TO ESTABLISH CARE WITH NEW DOCTOR: Primary | ICD-10-CM

## 2022-08-26 DIAGNOSIS — G43.119 INTRACTABLE MIGRAINE WITH AURA WITHOUT STATUS MIGRAINOSUS: ICD-10-CM

## 2022-08-26 DIAGNOSIS — N92.6 IRREGULAR MENSES: Primary | ICD-10-CM

## 2022-08-26 DIAGNOSIS — B00.1 FEVER BLISTER: ICD-10-CM

## 2022-08-26 PROBLEM — Z00.8 ENCOUNTER FOR SPECIALIZED MEDICAL EXAMINATION: Status: RESOLVED | Noted: 2021-06-29 | Resolved: 2022-08-26

## 2022-08-26 PROCEDURE — 99204 OFFICE O/P NEW MOD 45 MIN: CPT | Performed by: FAMILY MEDICINE

## 2022-08-26 PROCEDURE — 99212 OFFICE O/P EST SF 10 MIN: CPT | Performed by: NURSE PRACTITIONER

## 2022-08-26 PROCEDURE — 76830 TRANSVAGINAL US NON-OB: CPT | Performed by: NURSE PRACTITIONER

## 2022-08-26 RX ORDER — VALACYCLOVIR HYDROCHLORIDE 1 G/1
1000 TABLET, FILM COATED ORAL 2 TIMES DAILY
Qty: 8 TABLET | Refills: 0 | Status: SHIPPED | OUTPATIENT
Start: 2022-08-26 | End: 2022-09-28 | Stop reason: SDUPTHER

## 2022-08-26 ASSESSMENT — PATIENT HEALTH QUESTIONNAIRE - PHQ9
SUM OF ALL RESPONSES TO PHQ QUESTIONS 1-9: 0
1. LITTLE INTEREST OR PLEASURE IN DOING THINGS: 0
SUM OF ALL RESPONSES TO PHQ QUESTIONS 1-9: 0
2. FEELING DOWN, DEPRESSED OR HOPELESS: 0
2. FEELING DOWN, DEPRESSED OR HOPELESS: 0
SUM OF ALL RESPONSES TO PHQ QUESTIONS 1-9: 0
1. LITTLE INTEREST OR PLEASURE IN DOING THINGS: 0
SUM OF ALL RESPONSES TO PHQ QUESTIONS 1-9: 0
SUM OF ALL RESPONSES TO PHQ9 QUESTIONS 1 & 2: 0
SUM OF ALL RESPONSES TO PHQ9 QUESTIONS 1 & 2: 0

## 2022-08-26 ASSESSMENT — ANXIETY QUESTIONNAIRES
1. FEELING NERVOUS, ANXIOUS, OR ON EDGE: 0
6. BECOMING EASILY ANNOYED OR IRRITABLE: 0
GAD7 TOTAL SCORE: 0
4. TROUBLE RELAXING: 0
5. BEING SO RESTLESS THAT IT IS HARD TO SIT STILL: 0
7. FEELING AFRAID AS IF SOMETHING AWFUL MIGHT HAPPEN: 0
2. NOT BEING ABLE TO STOP OR CONTROL WORRYING: 0
IF YOU CHECKED OFF ANY PROBLEMS ON THIS QUESTIONNAIRE, HOW DIFFICULT HAVE THESE PROBLEMS MADE IT FOR YOU TO DO YOUR WORK, TAKE CARE OF THINGS AT HOME, OR GET ALONG WITH OTHER PEOPLE: NOT DIFFICULT AT ALL
3. WORRYING TOO MUCH ABOUT DIFFERENT THINGS: 0

## 2022-08-26 NOTE — PATIENT INSTRUCTIONS
IT WAS A PLEASURE TO MEET YOU TODAY! TAKE THE VALACYCLOVIR WHEN YOU FIRST FEEL A FEVER BLISTER. TAKE TWO PILLS IN THE MORNING AND TWO PILLS IN THE EVENING FOR ONE DAY ONLY. TAKE THE UBRELVY WHEN YOU FIRST FEEL A MIGRAINE COMING ON. IF IT DOES NOT HELP AFTER TWO HOURS, YOU MAY TAKE A SECOND PILL. DO NOT TAKE MORE THAN TWO PILLS IN 24 HOURS. DRINK LOTS OF WATER. YOU CAN ALSO DRINK CAFFEINE TO HELP IF YOU DEVELOP A MIGRAINE.       I WILL SEE YOU IN ONE MONTH BUT PLEASE CALL WITH concerns - [phone]

## 2022-08-26 NOTE — PROGRESS NOTES
Patient comes in today for gyn follow up with ultrasound. Patient states her bleeding stopped couple days again. LAST PAP:  06/29/2021, Negative    LAST MAMMO:  Never    LMP:  No LMP recorded. Patient has had an implant.     BIRTH CONTROL:  OCP (estrogen/progesterone) & Nexplanon    TOBACCO USE:  No    FAMILY HISTORY OF:   Breast Cancer:  No   Ovarian Cancer:  No   Uterine Cancer:  No   Colon Cancer:  No    Vitals:    08/26/22 0821   BP: 122/72   Site: Left Upper Arm   Position: Sitting   Weight: 116 lb (52.6 kg)   Height: 5' 4\" (1.626 m)        Ester Cruz MA  08/26/22  8:32 AM

## 2022-08-26 NOTE — ASSESSMENT & PLAN NOTE
Pelvic US today normal  Bleeding has resolved once pt started OCPs  Plan to continue OCPs and f/u with Dr Kacie Bennett for nexplanon removal

## 2022-08-26 NOTE — ASSESSMENT & PLAN NOTE
Patient with intermittent fever blisters over the last couple years, does not remember having these in the past.  Discussed with patient that fever blisters develop after exposure from herpes virus, though unable to tell patient how long ago she was exposed. Will prescribe Valtrex to take as needed.

## 2022-08-26 NOTE — PROGRESS NOTES
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 Pt seen 8/17/2022 with the following concerns  \"here today to discuss irregular vaginal bleeding. Pt has had nexplanon for > 1 year. Normally gets menses each month, very normal. But when menses started 7/15/22 and it hasn't stopped. Alternates between light and heavy. Also with migraines the past month. Was under a lot of stress with switching employers. Mild cramping, no severe pelvic pain. Denies vaginal discharge, itching odor or dysuria. \"      Today pt reports doing well. Started OCPs after last visit and bleeding stopped 2 days ago. No LMP recorded. Patient has had an implant.         Past Medical History:   Diagnosis Date    Anemia     Asthma     EIA, as child    Complication of anesthesia     irregular heart rhythm    Essential hypertension     Gestational hypertension     Irregular heart beat     during surgery on knee    Migraine     Primigravida in first trimester 5/14/2020    EDC by 7 1/7 week US - irreg menses       Past Surgical History:   Procedure Laterality Date    ORTHOPEDIC SURGERY Left     ACL reconstruction    TONSILLECTOMY         Family History   Problem Relation Age of Onset    Seizures Maternal Grandmother     Hypertension Maternal Grandmother     Diabetes Maternal Aunt     Hypertension Maternal Aunt     Uterine Cancer Neg Hx     Ovarian Cancer Neg Hx     Colon Cancer Neg Hx     Breast Cancer Neg Hx        Social History     Socioeconomic History    Marital status: Single     Spouse name: Not on file    Number of children: Not on file    Years of education: Not on file    Highest education level: Not on file   Occupational History    Not on file   Tobacco Use    Smoking status: Never    Smokeless tobacco: Never   Substance and Sexual Activity    Alcohol use: Not Currently    Drug use: Never    Sexual activity: Not on file   Other Topics Concern    Not on file   Social History Narrative Lives with grandmother  Abuse: Feels safe at home, no history of physical abuse, no history of sexual abuse       Social Determinants of Health     Financial Resource Strain: Not on file   Food Insecurity: Not on file   Transportation Needs: Not on file   Physical Activity: Not on file   Stress: Not on file   Social Connections: Not on file   Intimate Partner Violence: Not on file   Housing Stability: Not on file           Objective:    Vitals:    08/26/22 0821   BP: 122/72   Site: Left Upper Arm   Position: Sitting   Weight: 116 lb (52.6 kg)   Height: 5' 4\" (1.626 m)         Constitutional:  well-developed, well-nourished, and in no distress. Mental: Alert and awake. Oriented to person/place/time. Able to follow commands    Eyes: EOM nl, Sclera nl, Ocular Discharge not visualized    HENT: Normocephalic and atraumatic. Mouth/Throat: Mucous membranes are moist    External Ears: nl    Neck: Normal range of motion. No masses visualized       Pulmonary: Effort normal. No visualized signs of difficulty breathing or respiratory distress    Musculoskeletal: Normal range of motion. Normal gait with no signs of ataxia     Neurological: No Facial Asymmetry (Cranial nerve 7 motor function) No gaze palsy    Skin: No significant exanthematous lesions or discoloration noted on facial skin      Psych: Normal affect.  No hallucinations              Assessment/Plan            Patient Active Problem List    Diagnosis Date Noted    Irregular menses 08/17/2022     Priority: Medium     Assessment & Plan Note:     Pelvic US today normal  Bleeding has resolved once pt started OCPs  Plan to continue OCPs and f/u with Dr Shelia Morris for nexplanon removal      Encounter for specialized medical examination 06/29/2021     Overview Note:     Pap with reflex done 6/29/21        Nexplanon insertion 02/04/2021     Overview Note:     Inserted 2/4/21           Problem List Items Addressed This Visit          Other    Irregular menses - Primary Pelvic US today normal  Bleeding has resolved once pt started OCPs  Plan to continue OCPs and f/u with Dr Kevin Osborn for nexplanon removal         Relevant Orders    AMB POC US, TRANSVAGINAL (Completed)       Orders Placed This Encounter   Procedures    AMB POC US, TRANSVAGINAL       Outpatient Encounter Medications as of 8/26/2022   Medication Sig Dispense Refill    metroNIDAZOLE (FLAGYL) 500 MG tablet Take 1 tablet by mouth 2 times daily for 7 days 14 tablet 0    etonogestrel (NEXPLANON) 68 MG implant Inject 68 mg into the skin once      norethindrone-ethinyl estradiol (LOESTRIN FE 1/20) 1-20 MG-MCG per tablet Take 1 tablet by mouth in the morning. 1 packet 11     No facility-administered encounter medications on file as of 8/26/2022.                Samantha Strauss NP, APRN - CNP 08/26/22 8:37 AM

## 2022-08-26 NOTE — LETTER
[facility]  Tere [address]er [address] [place] [address]  Phone: [phone]  Fax: [phone]        August 26, 2022     Patient: Neftali Worthy   YOB: 2000   Date of Visit: 8/26/2022       To Whom It May Concern:    Ms. Sweta Ledezma is a current patient at [facility]. She has been under our care from 08/23/2022 until 08/26/2022 my medical opinion that Sweta Ledezma may return to work on 08/27/2022. If you have any questions or concerns, please don't hesitate to call.     Sincerely,        Rich Brown, JADA, APRN - CNP

## 2022-08-26 NOTE — LETTER
[facility]  Solomon Morro [address] [place] [address] [address]  Phone: [phone]  Fax: [phone]        August 26, 2022     Patient: Riddhi Galvez   YOB: 2000   Date of Visit: 8/26/2022       To Whom It May Concern:    Ms. Homar Vazquez is currently a patient at [facility]. She was under our care from 08/17/2022 until 08/18/2022. It is my medical opinion that Homar Vazquez may return to work on 08/19/2022. If you have any questions or concerns, please don't hesitate to call.     Sincerely,        Dorcas Bear NP, APRN - CNP

## 2022-08-30 ENCOUNTER — OFFICE VISIT (OUTPATIENT)
Dept: OBGYN CLINIC | Age: 22
End: 2022-08-30
Payer: COMMERCIAL

## 2022-08-30 VITALS — BODY MASS INDEX: 20.08 KG/M2 | SYSTOLIC BLOOD PRESSURE: 122 MMHG | WEIGHT: 117 LBS | DIASTOLIC BLOOD PRESSURE: 78 MMHG

## 2022-08-30 DIAGNOSIS — Z30.46 NEXPLANON REMOVAL: ICD-10-CM

## 2022-08-30 PROCEDURE — 11982 REMOVE DRUG IMPLANT DEVICE: CPT | Performed by: OBSTETRICS & GYNECOLOGY

## 2022-08-30 NOTE — ASSESSMENT & PLAN NOTE
After informed consent was obtained, area was prepped with betadine swabs. 1% lidocaine was used to infiltrate beneath the palpated jim tip. A #11 blade was used to incise the skin approximately 3-4 mm above the jim tip parallel to insertion plane. Nexplanon was removed without difficulty. The entire 4 cm jim was inspected and found to be intact. The area was dressed with a Band-aid and wrapped with coban. Post-procedure instructions were given to the pt.

## 2022-08-30 NOTE — PROGRESS NOTES
Pt is here today for nexplanon removal    LAST PAP:  6/29/2021    LAST MAMMO:  none    LMP:  No LMP recorded. Patient has had an implant.     BIRTH CONTROL:  nexplanon, switching to OCPs      FAMILY HISTORY OF:   Breast Cancer:  No   Ovarian Cancer:  No   Uterine Cancer:  No   Colon Cancer: No    Vitals:    08/30/22 1504   BP: 122/78   Site: Left Upper Arm   Position: Sitting   Weight: 117 lb (53.1 kg)       Radha Andre NP, APRN - CNP  08/30/22  3:06 PM

## 2022-08-30 NOTE — PROGRESS NOTES
[facility]  [address] [facility], [address] Chad Cason MD, Saad Reynolds, [facility]  Eris Tavarez MD, FACOG    Nexplanon Removal Note    Pt here for Nexplanon removal.  No new issues or complaints today. No LMP recorded. Patient has had an implant. Vitals:    08/30/22 1504   BP: 122/78   Site: Left Upper Arm   Position: Sitting   Weight: 117 lb (53.1 kg)       Problem List Items Addressed This Visit          Other    Nexplanon removal     After informed consent was obtained, area was prepped with betadine swabs. 1% lidocaine was used to infiltrate beneath the palpated jim tip. A #11 blade was used to incise the skin approximately 3-4 mm above the jim tip parallel to insertion plane. Nexplanon was removed without difficulty. The entire 4 cm jim was inspected and found to be intact. The area was dressed with a Band-aid and wrapped with coban. Post-procedure instructions were given to the pt.           Relevant Orders    REMOVAL DRUG IMPLANT Chun Ovalle MD  3:13 PM  08/30/22

## 2022-08-30 NOTE — PATIENT INSTRUCTIONS
Keep the wrap on your arm for 24 hours. You can take the band-aid off in 2-3 days. You may have some pain and bruising. You can use ice packs and ibuprofen to help with this. You can start your birth control pills Sunday. You should try to take them around the same time each day. Remember the pills may cause irregular bleeding for the first couple of months when starting pills. Thanks for coming to see us today and letting us take care of you!

## 2022-09-26 ASSESSMENT — ENCOUNTER SYMPTOMS
COUGH: 0
VOMITING: 0
NAUSEA: 0
ABDOMINAL PAIN: 0
SHORTNESS OF BREATH: 0
DIARRHEA: 0

## 2022-09-26 NOTE — PROGRESS NOTES
[address], DO  [address] [facility], [place] [address], [address]  [phone]         ASSESSMENT AND PLAN    Problem List Items Addressed This Visit          Digestive    Fever blister      Resolved. Will send refills of Valtrex in case patient develops more fever blisters in the future. Nervous and Auditory    Intractable migraine with aura without status migrainosus     Much improved since last visit. Patient had her Nexplanon removed and states that her headaches have almost gone away. Has not needed an Ubrelvy samples yet. Will continue to monitor. The diagnoses and plan were discussed with the patient, who verbalizes understanding and agrees with plan. All questions answered. Chief Complaint    Chief Complaint   Patient presents with    Follow-up    Migraine     Migrans are better       HISTORY OF PRESENT ILLNESS    25 y.o. female with migraine headaches presents today for follow up. Last seen one month ago with more frequent headaches, thought to be due to her Nexplanon, which was removed on 8/30/2022. Was given Ubrelvy samples and asked to return today after the Nexplanon was removed to see how she was doing. Notes that her headaches have almost gone away completely. States that she has not needed to take any of the Ubrelvy sample pills since her last visit. Notes that she was put on birth control to help with her migraines and bleeding but states that she stopped that as well. Wants to give her body a break for awhile. States that her fever blisters have also resolved. Does not have any more Valtrex for use in the future.     PAST MEDICAL HISTORY    Past Medical History:   Diagnosis Date    Anemia     Asthma     EIA, as child    Complication of anesthesia     irregular heart rhythm    Essential hypertension     Gestational hypertension     Intractable migraine with aura without status migrainosus 8/26/2022 Irregular heart beat     during surgery on knee    Migraine     Primigravida in first trimester 5/14/2020    EDC by 7 1/7 week US - irreg menses       PAST SURGICAL HISTORY    Past Surgical History:   Procedure Laterality Date    ORTHOPEDIC SURGERY Left     ACL reconstruction    TONSILLECTOMY         FAMILY HISTORY    Family History   Problem Relation Age of Onset    Seizures Maternal Grandmother     Hypertension Maternal Grandmother     Diabetes Maternal Aunt     Hypertension Maternal Aunt     Uterine Cancer Neg Hx     Ovarian Cancer Neg Hx     Colon Cancer Neg Hx     Breast Cancer Neg Hx        SOCIAL HISTORY    Social History     Socioeconomic History    Marital status: Single     Spouse name: None    Number of children: None    Years of education: None    Highest education level: None   Tobacco Use    Smoking status: Never    Smokeless tobacco: Never   Substance and Sexual Activity    Alcohol use: Not Currently     Comment: occ    Drug use: Never    Sexual activity: Yes     Partners: Male     Birth control/protection: Implant   Social History Narrative    Lives with grandmother  Abuse: Feels safe at home, no history of physical abuse, no history of sexual abuse         MEDICATIONS      Current Outpatient Medications:     valACYclovir (VALTREX) 1 g tablet, Take 1 tablet by mouth 2 times daily, Disp: 8 tablet, Rfl: 2    ALLERGIES / INTOLERANCES    No Known Allergies    REVIEW OF SYSTEMS    Review of Systems   Constitutional:  Negative for fever. HENT:  Negative for congestion and mouth sores. Respiratory:  Negative for cough and shortness of breath. Cardiovascular:  Negative for chest pain. Gastrointestinal:  Negative for abdominal pain, diarrhea, nausea and vomiting. Neurological:  Negative for headaches. Psychiatric/Behavioral:  Negative for dysphoric mood.          PHYSICAL EXAMINATION    Vitals:    09/28/22 1421   BP: 110/62   Pulse: 78   Resp: 12   SpO2: 99%         Physical Exam  Vitals and nursing note reviewed. Constitutional:       General: She is not in acute distress. Appearance: Normal appearance. HENT:      Head: Normocephalic and atraumatic. Right Ear: External ear normal.      Left Ear: External ear normal.      Nose: Nose normal.   Eyes:      Extraocular Movements: Extraocular movements intact. Pupils: Pupils are equal, round, and reactive to light. Cardiovascular:      Rate and Rhythm: Normal rate and regular rhythm. Heart sounds: Normal heart sounds. No murmur heard. Pulmonary:      Effort: Pulmonary effort is normal. No respiratory distress. Breath sounds: Normal breath sounds. Abdominal:      General: Bowel sounds are normal.      Palpations: Abdomen is soft. Tenderness: no abdominal tenderness There is no right CVA tenderness or left CVA tenderness. Musculoskeletal:         General: Normal range of motion. Cervical back: Normal range of motion. Skin:     General: Skin is warm. Findings: No rash. Neurological:      General: No focal deficit present. Mental Status: She is alert.    Psychiatric:         Mood and Affect: Mood normal.         Behavior: Behavior normal.         Sushil Dumas, DO  2:56 PM  09/28/22

## 2022-09-28 ENCOUNTER — OFFICE VISIT (OUTPATIENT)
Dept: PRIMARY CARE CLINIC | Facility: CLINIC | Age: 22
End: 2022-09-28
Payer: COMMERCIAL

## 2022-09-28 VITALS
WEIGHT: 116.5 LBS | DIASTOLIC BLOOD PRESSURE: 62 MMHG | HEIGHT: 64 IN | RESPIRATION RATE: 12 BRPM | OXYGEN SATURATION: 99 % | BODY MASS INDEX: 19.89 KG/M2 | SYSTOLIC BLOOD PRESSURE: 110 MMHG | HEART RATE: 78 BPM

## 2022-09-28 DIAGNOSIS — G43.119 INTRACTABLE MIGRAINE WITH AURA WITHOUT STATUS MIGRAINOSUS: ICD-10-CM

## 2022-09-28 DIAGNOSIS — B00.1 FEVER BLISTER: ICD-10-CM

## 2022-09-28 PROBLEM — Z76.89 ENCOUNTER TO ESTABLISH CARE WITH NEW DOCTOR: Status: RESOLVED | Noted: 2021-02-04 | Resolved: 2022-09-28

## 2022-09-28 PROBLEM — Z30.46 NEXPLANON REMOVAL: Status: RESOLVED | Noted: 2022-08-30 | Resolved: 2022-09-28

## 2022-09-28 PROCEDURE — 99214 OFFICE O/P EST MOD 30 MIN: CPT | Performed by: FAMILY MEDICINE

## 2022-09-28 RX ORDER — VALACYCLOVIR HYDROCHLORIDE 1 G/1
1000 TABLET, FILM COATED ORAL 2 TIMES DAILY
Qty: 8 TABLET | Refills: 2 | Status: SHIPPED | OUTPATIENT
Start: 2022-09-28

## 2022-09-28 ASSESSMENT — ANXIETY QUESTIONNAIRES
GAD7 TOTAL SCORE: 0
2. NOT BEING ABLE TO STOP OR CONTROL WORRYING: 0
5. BEING SO RESTLESS THAT IT IS HARD TO SIT STILL: 0
6. BECOMING EASILY ANNOYED OR IRRITABLE: 0
IF YOU CHECKED OFF ANY PROBLEMS ON THIS QUESTIONNAIRE, HOW DIFFICULT HAVE THESE PROBLEMS MADE IT FOR YOU TO DO YOUR WORK, TAKE CARE OF THINGS AT HOME, OR GET ALONG WITH OTHER PEOPLE: NOT DIFFICULT AT ALL
7. FEELING AFRAID AS IF SOMETHING AWFUL MIGHT HAPPEN: 0
4. TROUBLE RELAXING: 0
1. FEELING NERVOUS, ANXIOUS, OR ON EDGE: 0
3. WORRYING TOO MUCH ABOUT DIFFERENT THINGS: 0

## 2022-09-28 ASSESSMENT — PATIENT HEALTH QUESTIONNAIRE - PHQ9
SUM OF ALL RESPONSES TO PHQ QUESTIONS 1-9: 0
SUM OF ALL RESPONSES TO PHQ QUESTIONS 1-9: 0
2. FEELING DOWN, DEPRESSED OR HOPELESS: 0
SUM OF ALL RESPONSES TO PHQ QUESTIONS 1-9: 0
SUM OF ALL RESPONSES TO PHQ9 QUESTIONS 1 & 2: 0
SUM OF ALL RESPONSES TO PHQ QUESTIONS 1-9: 0
1. LITTLE INTEREST OR PLEASURE IN DOING THINGS: 0

## 2022-09-28 NOTE — ASSESSMENT & PLAN NOTE
Much improved since last visit. Patient had her Nexplanon removed and states that her headaches have almost gone away. Has not needed an Ubrelvy samples yet. Will continue to monitor.

## 2022-09-28 NOTE — PATIENT INSTRUCTIONS
IT WAS GREAT TO SEE YOU TODAY! SO HAPPY THAT YOUR MIGRAINES ARE BETTER! SAVE THE UBRELVY IN CASE YOU DEVELOP A SEVERE HEADACHE. THEN TAKE THE UBRELVY TO SEE IF IT HELPS BREAK THE HEADACHE. I HAVE SENT REFILLS OF VALTREX IN CASE YOU DEVELOP MORE FEVER BLISTERS IN THE FUTURE.     I WILL SEE YOU NEXT YEAR FOR A CHECK UP BUT PLEASE CALL WITH CONCERNS [phone]

## 2022-10-31 ENCOUNTER — OFFICE VISIT (OUTPATIENT)
Dept: PRIMARY CARE CLINIC | Facility: CLINIC | Age: 22
End: 2022-10-31
Payer: COMMERCIAL

## 2022-10-31 VITALS
SYSTOLIC BLOOD PRESSURE: 126 MMHG | WEIGHT: 120.5 LBS | RESPIRATION RATE: 16 BRPM | HEIGHT: 64 IN | OXYGEN SATURATION: 99 % | HEART RATE: 81 BPM | BODY MASS INDEX: 20.57 KG/M2 | DIASTOLIC BLOOD PRESSURE: 64 MMHG

## 2022-10-31 DIAGNOSIS — G44.309 POST-CONCUSSION HEADACHE: Primary | ICD-10-CM

## 2022-10-31 PROCEDURE — 99213 OFFICE O/P EST LOW 20 MIN: CPT | Performed by: FAMILY MEDICINE

## 2022-10-31 ASSESSMENT — PATIENT HEALTH QUESTIONNAIRE - PHQ9
1. LITTLE INTEREST OR PLEASURE IN DOING THINGS: 0
2. FEELING DOWN, DEPRESSED OR HOPELESS: 0
SUM OF ALL RESPONSES TO PHQ QUESTIONS 1-9: 0
SUM OF ALL RESPONSES TO PHQ QUESTIONS 1-9: 0
SUM OF ALL RESPONSES TO PHQ9 QUESTIONS 1 & 2: 0
SUM OF ALL RESPONSES TO PHQ QUESTIONS 1-9: 0
SUM OF ALL RESPONSES TO PHQ QUESTIONS 1-9: 0

## 2022-10-31 ASSESSMENT — ANXIETY QUESTIONNAIRES
7. FEELING AFRAID AS IF SOMETHING AWFUL MIGHT HAPPEN: 0
2. NOT BEING ABLE TO STOP OR CONTROL WORRYING: 0
5. BEING SO RESTLESS THAT IT IS HARD TO SIT STILL: 0
4. TROUBLE RELAXING: 0
GAD7 TOTAL SCORE: 0
IF YOU CHECKED OFF ANY PROBLEMS ON THIS QUESTIONNAIRE, HOW DIFFICULT HAVE THESE PROBLEMS MADE IT FOR YOU TO DO YOUR WORK, TAKE CARE OF THINGS AT HOME, OR GET ALONG WITH OTHER PEOPLE: NOT DIFFICULT AT ALL
3. WORRYING TOO MUCH ABOUT DIFFERENT THINGS: 0
1. FEELING NERVOUS, ANXIOUS, OR ON EDGE: 0
6. BECOMING EASILY ANNOYED OR IRRITABLE: 0

## 2022-10-31 ASSESSMENT — ENCOUNTER SYMPTOMS
NAUSEA: 0
COUGH: 0
ABDOMINAL PAIN: 0
DIARRHEA: 0
VOMITING: 0
SHORTNESS OF BREATH: 0

## 2022-10-31 NOTE — LETTER
[facility] - [address]  [address] [facility] [address] Dennie Shames  [address] [place] [address] Way [address]  Phone: [phone]  Fax: [address], [place]        October 31, 2022     Patient: Harley Chandra   YOB: 2000   Date of Visit: 10/31/2022       To Whom it May Concern:    Misael Lambert was seen in my clinic on 10/31/2022. Due to injury last week, please excuse from work on 10/24/2022. Was evaluated on 10/24/2022 by urgent care. If you have any questions or concerns, please don't hesitate to call.     Sincerely,         Estrella Etienne, DO

## 2022-10-31 NOTE — PATIENT INSTRUCTIONS
IT WAS GREAT TO SEE YOU TODAY! CONTINUE TO TAKE IBUPROFEN AS NEEDED FOR HEADACHES. DRINK LOTS OF WATER. MAKE SURE TO GIVE YOUR EYES A BREAK FROM BLUE SCREENS (LAPTOP, PHONE, TV, ETC) SEVERAL TIMES DURING THE WORK DAY AND AT HOME. I THINK YOUR HEADACHE WILL SLOWLY IMPROVE WITH TIME. PLEASE LET ME KNOW IF ANYTHING GETS WORSE OR YOU DEVELOP DIFFERENT SYMPTOMS.

## 2022-10-31 NOTE — PROGRESS NOTES
[address], DO  [address] [facility], [place] [address], [address]  [phone]         ASSESSMENT AND PLAN    Problem List Items Addressed This Visit          Other    Post-concussion headache - Primary        The diagnoses and plan were discussed with the patient, who verbalizes understanding and agrees with plan. All questions answered. Chief Complaint    Chief Complaint   Patient presents with    Headache     Pt states she fell 10/03/2022 and she has had a headache since. Pt states she was bleeding, did not go to hospital        HISTORY OF PRESENT ILLNESS    25 y.o. female with migraines presents today for persistent headache. Last seen one month ago in follow up after establishing with me a month earlier to establish care. Had her Nexplanon removed prior to her last visit and reports that her migraines had almost resolved. States that she went [place], [place] for her grandmother's  earlier this month. States that she was on the porch one day when she answered the phone and turned quickly, hitting her head on a beam.  Fell backward but did not lose consciousness. States that she hit the frontoparietal right scalp. Had some mild bleeding so cleaned it up and took some Tylenol. Was able to get right up with only a little dizziness at first.  States the dizziness went away. Came home after the  and has had intermittent headaches since then. States that last weekend she was getting ready for work when she had a throbbing headache on the right side of her head. Iced her head and took some Ibuprofen. Was unable to go to work. States that usually her migraines are on the left side. States this does not feel like her normal migraines. Denies nausea or vomiting. Denies any more dizziness or vision issues. States that Ibuprofen works for her headaches but that they keep coming back. Denies any more bleeding.     PAST MEDICAL HISTORY    Past Medical History:   Diagnosis Date    Anemia     Asthma     EIA, as child    Complication of anesthesia     irregular heart rhythm    Essential hypertension     Gestational hypertension     Intractable migraine with aura without status migrainosus 8/26/2022    Irregular heart beat     during surgery on knee    Migraine     Primigravida in first trimester 5/14/2020    EDC by 7 1/7 week US - irreg menses       PAST SURGICAL HISTORY    Past Surgical History:   Procedure Laterality Date    ORTHOPEDIC SURGERY Left     ACL reconstruction    TONSILLECTOMY         FAMILY HISTORY    Family History   Problem Relation Age of Onset    Seizures Maternal Grandmother     Hypertension Maternal Grandmother     Diabetes Maternal Aunt     Hypertension Maternal Aunt     Uterine Cancer Neg Hx     Ovarian Cancer Neg Hx     Colon Cancer Neg Hx     Breast Cancer Neg Hx        SOCIAL HISTORY    Social History     Socioeconomic History    Marital status: Single     Spouse name: None    Number of children: None    Years of education: None    Highest education level: None   Tobacco Use    Smoking status: Never    Smokeless tobacco: Never   Substance and Sexual Activity    Alcohol use: Not Currently     Comment: occ    Drug use: Never    Sexual activity: Yes     Partners: Male     Birth control/protection: Implant   Social History Narrative    Lives with grandmother  Abuse: Feels safe at home, no history of physical abuse, no history of sexual abuse         MEDICATIONS      Current Outpatient Medications:     valACYclovir (VALTREX) 1 g tablet, Take 1 tablet by mouth 2 times daily, Disp: 8 tablet, Rfl: 2    ALLERGIES / INTOLERANCES    No Known Allergies    REVIEW OF SYSTEMS    Review of Systems   Constitutional:  Negative for fever. HENT:  Negative for congestion. Eyes:  Negative for visual disturbance. Respiratory:  Negative for cough and shortness of breath. Cardiovascular:  Negative for chest pain.    Gastrointestinal: Negative for abdominal pain, diarrhea, nausea and vomiting. Neurological:  Positive for headaches. Negative for dizziness and syncope. Psychiatric/Behavioral:  Negative for dysphoric mood. PHYSICAL EXAMINATION    Vitals:    10/31/22 1453   BP: 126/64   Pulse: 81   Resp: 16   SpO2: 99%         Physical Exam  Vitals and nursing note reviewed. Constitutional:       General: She is not in acute distress. Appearance: Normal appearance. HENT:      Head: Normocephalic and atraumatic. Right Ear: External ear normal.      Left Ear: External ear normal.      Nose: Nose normal.   Eyes:      Extraocular Movements: Extraocular movements intact. Pupils: Pupils are equal, round, and reactive to light. Cardiovascular:      Rate and Rhythm: Normal rate and regular rhythm. Heart sounds: Normal heart sounds. No murmur heard. Pulmonary:      Effort: Pulmonary effort is normal. No respiratory distress. Breath sounds: Normal breath sounds. Abdominal:      General: Bowel sounds are normal.      Palpations: Abdomen is soft. Tenderness: There is no abdominal tenderness. There is no right CVA tenderness or left CVA tenderness. Musculoskeletal:         General: Normal range of motion. Cervical back: Normal range of motion. Skin:     General: Skin is warm. Findings: No abrasion or rash. Neurological:      General: No focal deficit present. Mental Status: She is alert. Cranial Nerves: Cranial nerves 2-12 are intact. Motor: Motor function is intact. Coordination: Coordination is intact.    Psychiatric:         Mood and Affect: Mood normal.         Behavior: Behavior normal.           Selma White DO  8:56 PM  10/31/22

## 2022-11-28 ENCOUNTER — ROUTINE PRENATAL (OUTPATIENT)
Dept: OBGYN CLINIC | Age: 22
End: 2022-11-28
Payer: COMMERCIAL

## 2022-11-28 VITALS
SYSTOLIC BLOOD PRESSURE: 114 MMHG | BODY MASS INDEX: 19.63 KG/M2 | WEIGHT: 115 LBS | HEIGHT: 64 IN | DIASTOLIC BLOOD PRESSURE: 72 MMHG

## 2022-11-28 DIAGNOSIS — O36.80X0 ENCOUNTER TO DETERMINE FETAL VIABILITY OF PREGNANCY, SINGLE OR UNSPECIFIED FETUS: Primary | ICD-10-CM

## 2022-11-28 DIAGNOSIS — B00.1 HERPES LABIALIS: ICD-10-CM

## 2022-11-28 DIAGNOSIS — Z87.59 HISTORY OF GESTATIONAL HYPERTENSION: ICD-10-CM

## 2022-11-28 DIAGNOSIS — Z87.59 HISTORY OF VACUUM EXTRACTION ASSISTED DELIVERY: ICD-10-CM

## 2022-11-28 DIAGNOSIS — Z34.81 MULTIGRAVIDA IN FIRST TRIMESTER: ICD-10-CM

## 2022-11-28 PROBLEM — N92.6 IRREGULAR MENSES: Status: RESOLVED | Noted: 2022-08-17 | Resolved: 2022-11-28

## 2022-11-28 PROBLEM — G43.119 INTRACTABLE MIGRAINE WITH AURA WITHOUT STATUS MIGRAINOSUS: Status: RESOLVED | Noted: 2022-08-26 | Resolved: 2022-11-28

## 2022-11-28 PROBLEM — G44.309 POST-CONCUSSION HEADACHE: Status: RESOLVED | Noted: 2022-10-31 | Resolved: 2022-11-28

## 2022-11-28 PROCEDURE — 76801 OB US < 14 WKS SINGLE FETUS: CPT | Performed by: NURSE PRACTITIONER

## 2022-11-28 PROCEDURE — 99213 OFFICE O/P EST LOW 20 MIN: CPT | Performed by: NURSE PRACTITIONER

## 2022-11-28 RX ORDER — ASPIRIN 81 MG/1
162 TABLET, CHEWABLE ORAL DAILY
Qty: 60 TABLET | Refills: 0
Start: 2022-11-28

## 2022-11-28 NOTE — ASSESSMENT & PLAN NOTE
History reviewed  PNV's ordered (if not already taking)  PN labs, UDS ordered  Problem list reviewed  OB physical completed  OB prenatal packet/education reviewed: Information included discusses general pregnancy topics, fetal development, weight gain, nutrition, breastfeeding, risky behaviors, WIC, vaccinations and hospital information. RTO for labs anytime this week and OBV in 4 weeks  PTL/labor precautions, [address], and pregnancy warning signs reviewed. Genetic testing - D/W pt at length genetic testing that is recommended by ACOG -- NIPT, Quad screen (for trisomies and NTD), CF, SMA. Brief discussion of these diseases - conditions that may increase risks, etiology, carrier states, fetal effects, treatment options, etc was undertaken. D/W pt that these are screening tests/carrier screening test only and are NOT mandatory. We also discuss false POS/false neg rates. It is her decision whether to have them done and how to proceed with the information afterwards.

## 2022-11-28 NOTE — PROGRESS NOTES
Patient comes in today for initial prenatal visit. No complaints/concerns today. Fetal Movements:  No  Contractions:  No  Vaginal Bleeding:  No  Leaking Fluid:  No  GI/ issues:  No    Drug/Alcohol 4P's Plus Screening    1. Have either of your parents ever had a problem with drugs/alcohol/prescription drugs? No  2. Does your partner have a problem with drugs/alcohol/prescription drugs? No  3. In the past, have you ever had a problem with drugs/alcohol/prescription drugs? No  4. Before you were pregnant, in the past month, have you done any drugs, drank any alcohol or abused any prescription drugs? No  If \"YES\" to any of the above, please give further details:      LAST PAP:  06/29/2021 Negative     LAST MAMMO:  Never    LMP:  No LMP recorded (lmp unknown). Patient is pregnant. FAMILY HISTORY OF:   Breast Cancer:  No   Ovarian Cancer:  No   Uterine Cancer:  No   Colon Cancer:  No    There were no vitals filed for this visit.      [place], [place]  11/28/22  11:53 AM

## 2022-11-28 NOTE — PROGRESS NOTES
HPI    This is a 25 y.o.   at 9w3d for new OB visit. Her Estimated Due Date is 2023, by Ultrasound            Denies leaking of fluid, vaginal bleeding, or regular contractions. Current Outpatient Medications on File Prior to Visit   Medication Sig Dispense Refill    Prenatal Vit-Fe Fumarate-FA (PRENATAL VITAMIN PO) Take 1 tablet by mouth daily      valACYclovir (VALTREX) 1 g tablet Take 1 tablet by mouth 2 times daily (Patient not taking: Reported on 2022) 8 tablet 2     No current facility-administered medications on file prior to visit.        No Known Allergies        OB History    Para Term  AB Living   2 1 1 0 0 1   SAB IAB Ectopic Molar Multiple Live Births   0 0 0 0 0 1       # 1 - Date: 20, Sex: Male, Weight: 6 lb 13.7 oz (3.11 kg), GA: 37w2d, Delivery: Vaginal, Vacuum (Extractor), Apgar1: 9, Apgar5: 9, Living: Living, Birth Comments: None    # 2 - Date: None, Sex: None, Weight: None, GA: None, Delivery: None, Apgar1: None, Apgar5: None, Living: None, Birth Comments: None          Past Medical History:   Diagnosis Date    Anemia     Asthma     EIA, as child    Complication of anesthesia     irregular heart rhythm    Gestational hypertension     Intractable migraine with aura without status migrainosus 2022    Irregular heart beat     during surgery on knee    Migraine        Past Surgical History:   Procedure Laterality Date    ORTHOPEDIC SURGERY Left     ACL reconstruction    TONSILLECTOMY         Family History   Problem Relation Age of Onset    Seizures Maternal Grandmother     Hypertension Maternal Grandmother     Diabetes Maternal Aunt     Hypertension Maternal Aunt     Uterine Cancer Neg Hx     Ovarian Cancer Neg Hx     Colon Cancer Neg Hx     Breast Cancer Neg Hx        Social History     Socioeconomic History    Marital status: Single     Spouse name: Not on file    Number of children: Not on file    Years of education: Not on file    Highest education level: Not on file   Occupational History    Not on file   Tobacco Use    Smoking status: Never    Smokeless tobacco: Never   Substance and Sexual Activity    Alcohol use: Not Currently     Comment: occ    Drug use: Never    Sexual activity: Yes     Partners: Male     Birth control/protection: Implant   Other Topics Concern    Not on file   Social History Narrative    Lives with grandmother  Abuse: Feels safe at home, no history of physical abuse, no history of sexual abuse       Social Determinants of Health     Financial Resource Strain: Not on file   Food Insecurity: Not on file   Transportation Needs: Not on file   Physical Activity: Not on file   Stress: Not on file   Social Connections: Not on file   Intimate Partner Violence: Not on file   Housing Stability: Not on file           Objective        Vitals:    11/28/22 1153   BP: 114/72   Site: Left Upper Arm   Position: Sitting   Weight: 115 lb (52.2 kg)   Height: 5' 4\" (1.626 m)           General: well developed, well nourished, in no acute distress    Head: normocephalic and atraumatic    Resp: even and unlabored    Psych: Normal mood and affect        Assessment and Plan    More than 50% of this 30 minute visit was spent counseling the patient on pregnancy expectations. Patient Active Problem List    Diagnosis Date Noted    Multigravida in first trimester 11/28/2022     Priority: Medium     Overview Note:     HAKEEM 6/30/2023 by 9 wk US    7/16/2020: CF/SMA negative (prev pregnancy)       Assessment & Plan Note:     History reviewed  PNV's ordered (if not already taking)  PN labs, UDS ordered  Problem list reviewed  OB physical completed  OB prenatal packet/education reviewed: Information included discusses general pregnancy topics, fetal development, weight gain, nutrition, breastfeeding, risky behaviors, WIC, vaccinations and hospital information.     RTO for labs anytime this week and OBV in 4 weeks  PTL/labor precautions, [address], and pregnancy warning signs reviewed. Genetic testing - D/W pt at length genetic testing that is recommended by ACOG -- NIPT, Quad screen (for trisomies and NTD), CF, SMA. Brief discussion of these diseases - conditions that may increase risks, etiology, carrier states, fetal effects, treatment options, etc was undertaken. D/W pt that these are screening tests/carrier screening test only and are NOT mandatory. We also discuss false POS/false neg rates. It is her decision whether to have them done and how to proceed with the information afterwards.  History of vacuum extraction assisted delivery 11/28/2022     Priority: Medium     Overview Note:     Plan: routine growth US in 3rd trimester      History of gestational hypertension 11/28/2022     Priority: Medium     Overview Note:     Plan: Start  mg daily at 12-36 weeks      Herpes labialis 08/26/2022     Priority: Medium     Overview Note:     11/28/22: Oral only, denies any genital lesions          Problem List Items Addressed This Visit        Digestive    Herpes labialis       Other    History of gestational hypertension    Relevant Orders    Comprehensive Metabolic Panel    History of vacuum extraction assisted delivery    Multigravida in first trimester     History reviewed  PNV's ordered (if not already taking)  PN labs, UDS ordered  Problem list reviewed  OB physical completed  OB prenatal packet/education reviewed: Information included discusses general pregnancy topics, fetal development, weight gain, nutrition, breastfeeding, risky behaviors, WIC, vaccinations and hospital information. RTO for labs anytime this week and OBV in 4 weeks  PTL/labor precautions, [address], and pregnancy warning signs reviewed. Genetic testing - D/W pt at length genetic testing that is recommended by ACOG -- NIPT, Quad screen (for trisomies and NTD), CF, SMA.   Brief discussion of these diseases - conditions that may increase risks, etiology, carrier states, fetal effects, treatment options, etc was undertaken. D/W pt that these are screening tests/carrier screening test only and are NOT mandatory. We also discuss false POS/false neg rates. It is her decision whether to have them done and how to proceed with the information afterwards. Relevant Orders    ABO/Rh    Antibody Screen    CBC    Hemoglobinopathy Evaluation    Hepatitis B Surface Antigen    Hepatitis C Antibody    RPR    HIV 1/2 Ag/Ab, 4TH Generation,W Rflx Confirm    Rubella antibody, IgG    Hemoglobin A1C    Urine Drug Screen    Chlamydia, Gonorrhea, Trichomoniasis    Comprehensive Metabolic Panel   Other Visit Diagnoses     Encounter to determine fetal viability of pregnancy, single or unspecified fetus    -  Primary    Relevant Orders    AMB POC US OB < 14 WKS, 1ST GESTATION (Completed)          Orders Placed This Encounter   Procedures    Chlamydia, Gonorrhea, Trichomoniasis    AMB POC US OB < 14 WKS, 1ST GESTATION    CBC    Hemoglobinopathy Evaluation    Hepatitis B Surface Antigen    Hepatitis C Antibody    RPR    HIV 1/2 Ag/Ab, 4TH Generation,W Rflx Confirm    Rubella antibody, IgG    Hemoglobin A1C    Urine Drug Screen    Comprehensive Metabolic Panel    ABO/Rh    Antibody Screen       Outpatient Encounter Medications as of 11/28/2022   Medication Sig Dispense Refill    Prenatal Vit-Fe Fumarate-FA (PRENATAL VITAMIN PO) Take 1 tablet by mouth daily      aspirin (ASPIRIN ADULT LOW STRENGTH) 81 MG chewable tablet Take 2 tablets by mouth daily 60 tablet 0    valACYclovir (VALTREX) 1 g tablet Take 1 tablet by mouth 2 times daily (Patient not taking: Reported on 11/28/2022) 8 tablet 2     No facility-administered encounter medications on file as of 11/28/2022.                Labor signs, pregnancy warning signs, and fetal movement counting reviewed (if applicable)            Jun Mejia NP, APRN - CNP 11/28/22 12:46 PM

## 2022-11-29 NOTE — PROGRESS NOTES
I have reviewed the patient's visit today including history, exam and assessment by RILEY Brown. I agree with treatment/plan as above.     Martin Madrigal MD  8:12 AM  11/29/22

## 2022-12-27 ENCOUNTER — ROUTINE PRENATAL (OUTPATIENT)
Dept: OBGYN CLINIC | Age: 22
End: 2022-12-27
Payer: COMMERCIAL

## 2022-12-27 VITALS
WEIGHT: 118 LBS | HEIGHT: 64 IN | SYSTOLIC BLOOD PRESSURE: 112 MMHG | DIASTOLIC BLOOD PRESSURE: 68 MMHG | BODY MASS INDEX: 20.14 KG/M2

## 2022-12-27 DIAGNOSIS — Z87.59 HISTORY OF GESTATIONAL HYPERTENSION: ICD-10-CM

## 2022-12-27 DIAGNOSIS — Z34.82 MULTIGRAVIDA IN SECOND TRIMESTER: ICD-10-CM

## 2022-12-27 DIAGNOSIS — Z34.81 MULTIGRAVIDA IN FIRST TRIMESTER: ICD-10-CM

## 2022-12-27 DIAGNOSIS — Z87.59 HISTORY OF SHOULDER DYSTOCIA IN PRIOR PREGNANCY: ICD-10-CM

## 2022-12-27 LAB
ABO + RH BLD: NORMAL
ALBUMIN SERPL-MCNC: 3.6 G/DL (ref 3.5–5)
ALBUMIN/GLOB SERPL: 1 (ref 0.4–1.6)
ALP SERPL-CCNC: 32 U/L (ref 50–136)
ALT SERPL-CCNC: 16 U/L (ref 12–65)
AMPHET UR QL SCN: NEGATIVE
ANION GAP SERPL CALC-SCNC: 8 MMOL/L (ref 2–11)
AST SERPL-CCNC: 14 U/L (ref 15–37)
BARBITURATES UR QL SCN: NEGATIVE
BENZODIAZ UR QL: NEGATIVE
BILIRUB SERPL-MCNC: 0.4 MG/DL (ref 0.2–1.1)
BLOOD GROUP ANTIBODIES SERPL: NORMAL
BUN SERPL-MCNC: 2 MG/DL (ref 6–23)
CALCIUM SERPL-MCNC: 8.9 MG/DL (ref 8.3–10.4)
CANNABINOIDS UR QL SCN: NEGATIVE
CHLORIDE SERPL-SCNC: 105 MMOL/L (ref 101–110)
CO2 SERPL-SCNC: 25 MMOL/L (ref 21–32)
COCAINE UR QL SCN: NEGATIVE
CREAT SERPL-MCNC: 0.5 MG/DL (ref 0.6–1)
ERYTHROCYTE [DISTWIDTH] IN BLOOD BY AUTOMATED COUNT: 12.5 % (ref 11.9–14.6)
GLOBULIN SER CALC-MCNC: 3.5 G/DL (ref 2.8–4.5)
GLUCOSE SERPL-MCNC: 54 MG/DL (ref 65–100)
HBV SURFACE AG SER QL: NONREACTIVE
HCT VFR BLD AUTO: 36.5 % (ref 35.8–46.3)
HCV AB SER QL: NONREACTIVE
HGB BLD-MCNC: 12.2 G/DL (ref 11.7–15.4)
MCH RBC QN AUTO: 31 PG (ref 26.1–32.9)
MCHC RBC AUTO-ENTMCNC: 33.4 G/DL (ref 31.4–35)
MCV RBC AUTO: 92.9 FL (ref 82–102)
METHADONE UR QL: NEGATIVE
NRBC # BLD: 0 K/UL (ref 0–0.2)
OPIATES UR QL: NEGATIVE
PCP UR QL: NEGATIVE
PLATELET # BLD AUTO: 299 K/UL (ref 150–450)
PMV BLD AUTO: 10.4 FL (ref 9.4–12.3)
POTASSIUM SERPL-SCNC: 3.1 MMOL/L (ref 3.5–5.1)
PROT SERPL-MCNC: 7.1 G/DL (ref 6.3–8.2)
RBC # BLD AUTO: 3.93 M/UL (ref 4.05–5.2)
RUBV IGG SERPL IA-ACNC: 34.6 IU/ML (ref 0–50)
SODIUM SERPL-SCNC: 138 MMOL/L (ref 133–143)
WBC # BLD AUTO: 6.7 K/UL (ref 4.3–11.1)

## 2022-12-27 PROCEDURE — 99213 OFFICE O/P EST LOW 20 MIN: CPT | Performed by: OBSTETRICS & GYNECOLOGY

## 2022-12-27 ASSESSMENT — PATIENT HEALTH QUESTIONNAIRE - PHQ9
SUM OF ALL RESPONSES TO PHQ QUESTIONS 1-9: 0
2. FEELING DOWN, DEPRESSED OR HOPELESS: 0
1. LITTLE INTEREST OR PLEASURE IN DOING THINGS: 0
SUM OF ALL RESPONSES TO PHQ QUESTIONS 1-9: 0
SUM OF ALL RESPONSES TO PHQ9 QUESTIONS 1 & 2: 0
SUM OF ALL RESPONSES TO PHQ QUESTIONS 1-9: 0
SUM OF ALL RESPONSES TO PHQ QUESTIONS 1-9: 0

## 2022-12-27 NOTE — PROGRESS NOTES
Patient comes in today for routine prenatal visit. No complaints/concerns today.      Fetal Movement: No  Contractions: No  Vaginal Bleeding: No  Leaking Fluid: No  GI/: No    Vitals:    12/27/22 1009   BP: 112/68   Site: Left Upper Arm   Position: Sitting   Weight: 118 lb (53.5 kg)   Height: 5' 4\" (1.626 m)

## 2022-12-27 NOTE — PROGRESS NOTES
Chief Complaint   Patient presents with    Routine Prenatal Visit        This 25 y.o. Naima Esparza at 13w4d with Estimated Date of Delivery: 6/30/23 presents for routine prenatal visit. Patient has no complaints today. Pt reports no LOF, VB, ctx. Pt denies H/A, vision changes, abdom pain, N/V. Vitals:    12/27/22 1009   BP: 112/68   Site: Left Upper Arm   Position: Sitting   Weight: 118 lb (53.5 kg)   Height: 5' 4\" (1.626 m)        Patient Active Problem List    Diagnosis Date Noted    Multigravida in second trimester 11/28/2022     Priority: Medium     Overview Note:     EDC by 9 3/7 week US - unknown LMP    7/16/2020: CF/SMA negative (prev pregnancy)       Assessment & Plan Note:     Instructed pt to contact the office or seek immediate care if develops fever > 101.0, severe lower abdominal pain or heavy vaginal bleeding (soaking 2 or more pads per hour). D/W pt at length genetic testing that is recommended by ACOG -- NIPT, Quad screen (for trisomies and NTD), CF, SMA. Brief discussion of these diseases - conditions that may increase risks, etiology, carrier states, fetal effects, treatment options, etc was undertaken. D/W pt that these are screening tests only and are NOT mandatory. It is her decision whether to have them done and how to proceed with the information afterwards. All questions answered, pt understood and wishes to proceed with indicated tests.        History of shoulder dystocia in prior pregnancy 11/28/2022     Priority: Medium     Overview Note:     With VAVD in 2020    PLAN: serial growth US in 3rd trimester       Assessment & Plan Note:     noted      History of gestational hypertension 11/28/2022     Priority: Medium     Overview Note:     PLAN: baby  mg daily at 12-36 weeks, close ob BP throughout preg       Assessment & Plan Note:     noted      Herpes labialis 08/26/2022     Priority: Medium     Overview Note:     11/28/22: Oral only, denies any genital lesions        Problem List Items Addressed This Visit        Mj De La Rosa in second trimester     Instructed pt to contact the office or seek immediate care if develops fever > 101.0, severe lower abdominal pain or heavy vaginal bleeding (soaking 2 or more pads per hour). D/W pt at length genetic testing that is recommended by ACOG -- NIPT, Quad screen (for trisomies and NTD), CF, SMA. Brief discussion of these diseases - conditions that may increase risks, etiology, carrier states, fetal effects, treatment options, etc was undertaken. D/W pt that these are screening tests only and are NOT mandatory. It is her decision whether to have them done and how to proceed with the information afterwards. All questions answered, pt understood and wishes to proceed with indicated tests.           History of shoulder dystocia in prior pregnancy     noted         History of gestational hypertension     noted             Juventino Judge MD     10:30 AM    12/27/22

## 2022-12-28 LAB
EST. AVERAGE GLUCOSE BLD GHB EST-MCNC: 97 MG/DL
HBA1C MFR BLD: 5 % (ref 4.8–5.6)
HIV 1+2 AB+HIV1 P24 AG SERPL QL IA: NONREACTIVE
HIV 1/2 RESULT COMMENT: NORMAL
RPR SER QL: NONREACTIVE

## 2022-12-29 LAB
HGB A MFR BLD: 97.3 % (ref 96.4–98.8)
HGB A2 MFR BLD COLUMN CHROM: 2.7 % (ref 1.8–3.2)
HGB F MFR BLD: 0 % (ref 0–2)
HGB FRACT BLD-IMP: NORMAL
HGB S MFR BLD: 0 %

## 2022-12-30 LAB
C TRACH RRNA SPEC QL NAA+PROBE: NEGATIVE
N GONORRHOEA RRNA SPEC QL NAA+PROBE: NEGATIVE
SPECIMEN SOURCE: NORMAL
T VAGINALIS RRNA SPEC QL NAA+PROBE: NEGATIVE

## 2023-01-09 ENCOUNTER — TELEPHONE (OUTPATIENT)
Dept: OBGYN CLINIC | Age: 23
End: 2023-01-09

## 2023-01-09 NOTE — TELEPHONE ENCOUNTER
Patient LM stating she was having headache, migraine and in pain. I called patient back to discuss her concerns. Patient states she took some Excedrin which has helped the headache. Patient states she only drinks 20-30 ounces of water per day. I advised her that she needs to increase her water intake to 6-8 bottles (16.9 oz) per day, eat something healthy every couple hours and to use the medications on the list for headache. Patient advised that if the medications do not help she will need to go to the hospital or let us know. Patient voiced understanding.  jose antonio

## 2023-01-11 ENCOUNTER — TELEPHONE (OUTPATIENT)
Dept: OBGYN CLINIC | Age: 23
End: 2023-01-11

## 2023-01-11 DIAGNOSIS — Z34.82 MULTIGRAVIDA IN SECOND TRIMESTER: ICD-10-CM

## 2023-01-11 LAB — NIPT, EXTERNAL: NORMAL

## 2023-01-11 NOTE — TELEPHONE ENCOUNTER
Please let pt know her genetics screening tests were all normal    NIPT neg x3,     If she wants to know the sex of the baby it is a baby boy

## 2023-01-30 ENCOUNTER — ROUTINE PRENATAL (OUTPATIENT)
Dept: OBGYN CLINIC | Age: 23
End: 2023-01-30
Payer: COMMERCIAL

## 2023-01-30 VITALS
DIASTOLIC BLOOD PRESSURE: 72 MMHG | SYSTOLIC BLOOD PRESSURE: 110 MMHG | BODY MASS INDEX: 21.68 KG/M2 | HEIGHT: 64 IN | WEIGHT: 127 LBS

## 2023-01-30 DIAGNOSIS — Z34.82 MULTIGRAVIDA IN SECOND TRIMESTER: ICD-10-CM

## 2023-01-30 DIAGNOSIS — Z87.59 HISTORY OF SHOULDER DYSTOCIA IN PRIOR PREGNANCY: ICD-10-CM

## 2023-01-30 DIAGNOSIS — Z87.59 HISTORY OF GESTATIONAL HYPERTENSION: ICD-10-CM

## 2023-01-30 DIAGNOSIS — Z34.82 MULTIGRAVIDA IN SECOND TRIMESTER: Primary | ICD-10-CM

## 2023-01-30 PROCEDURE — 99213 OFFICE O/P EST LOW 20 MIN: CPT | Performed by: NURSE PRACTITIONER

## 2023-01-30 NOTE — PROGRESS NOTES
Patient comes in today for routine prenatal visit. No complaints/concerns today.      Fetal Movement: Yes  Contractions: No  Vaginal Bleeding: No  Leaking Fluid: No  GI/: No    Vitals:    01/30/23 0854   BP: 110/72   Site: Left Upper Arm   Position: Sitting   Weight: 127 lb (57.6 kg)   Height: 5' 4\" (1.626 m)

## 2023-01-30 NOTE — ASSESSMENT & PLAN NOTE
PTL/labor precautions, [address], and pregnancy warning signs reviewed. Pt advised to call the office at [phone] or go straight to Labor and Delivery at [facility] with any of the following concerns vaginal bleeding, leaking of fluid, lola regularly Q 5-7 minutes for over an hour or not feeling the baby move.    RTO 3 weeks OBV/US

## 2023-01-30 NOTE — PROGRESS NOTES
This is a 25 y.o.   at 18w3d for routine OB visit. Her Estimated Due Date is 2023, by Ultrasound    Denies leaking of fluid, vaginal bleeding, or regular contractions. Reports fetal movement. Current Outpatient Medications on File Prior to Visit   Medication Sig Dispense Refill    Prenatal Vit-Fe Fumarate-FA (PRENATAL VITAMIN PO) Take 1 tablet by mouth daily      aspirin (ASPIRIN ADULT LOW STRENGTH) 81 MG chewable tablet Take 2 tablets by mouth daily 60 tablet 0    valACYclovir (VALTREX) 1 g tablet Take 1 tablet by mouth 2 times daily (Patient not taking: Reported on 2023) 8 tablet 2     No current facility-administered medications on file prior to visit.        No Known Allergies    OB History    Para Term  AB Living   2 1 1 0 0 1   SAB IAB Ectopic Molar Multiple Live Births   0 0 0 0 0 1       # 1 - Date: 20, Sex: Male, Weight: 6 lb 13.7 oz (3.11 kg), GA: 37w2d, Delivery: Vaginal, Vacuum (Extractor), Apgar1: 9, Apgar5: 9, Living: Living, Birth Comments: None    # 2 - Date: None, Sex: None, Weight: None, GA: None, Delivery: None, Apgar1: None, Apgar5: None, Living: None, Birth Comments: None        Past Medical History:   Diagnosis Date    Anemia     Asthma     EIA, as child    Complication of anesthesia     irregular heart rhythm    Gestational hypertension     Intractable migraine with aura without status migrainosus 2022    Irregular heart beat     during surgery on knee    Migraine        Past Surgical History:   Procedure Laterality Date    ORTHOPEDIC SURGERY Left     ACL reconstruction    TONSILLECTOMY         Family History   Problem Relation Age of Onset    Seizures Maternal Grandmother     Hypertension Maternal Grandmother     Diabetes Maternal Aunt     Hypertension Maternal Aunt     Uterine Cancer Neg Hx     Ovarian Cancer Neg Hx     Colon Cancer Neg Hx     Breast Cancer Neg Hx        Social History     Socioeconomic History    Marital status: Single     Spouse name: Not on file    Number of children: Not on file    Years of education: Not on file    Highest education level: Not on file   Occupational History    Not on file   Tobacco Use    Smoking status: Never    Smokeless tobacco: Never   Substance and Sexual Activity    Alcohol use: Not Currently     Comment: occ    Drug use: Never    Sexual activity: Yes     Partners: Male     Birth control/protection: Implant   Other Topics Concern    Not on file   Social History Narrative    Lives with grandmother  Abuse: Feels safe at home, no history of physical abuse, no history of sexual abuse       Social Determinants of Health     Financial Resource Strain: Not on file   Food Insecurity: Not on file   Transportation Needs: Not on file   Physical Activity: Not on file   Stress: Not on file   Social Connections: Not on file   Intimate Partner Violence: Not on file   Housing Stability: Not on file           Objective    Vitals:    01/30/23 0854   BP: 110/72   Site: Left Upper Arm   Position: Sitting   Weight: 127 lb (57.6 kg)   Height: 5' 4\" (1.626 m)       General: well developed, well nourished, in no acute distress    Head: normocephalic and atraumatic    Resp: even and unlabored    Psych: Normal mood and affect        Assessment and Plan      Patient Active Problem List    Diagnosis Date Noted    Multigravida in second trimester 11/28/2022     Priority: Medium     Overview Note:     EDC by 9 3/7 week US - unknown LMP    7/16/2020: CF/SMA negative (prev pregnancy)  01/11/2023: NIPT neg x3, male       Assessment & Plan Note:     PTL/labor precautions, [address], and pregnancy warning signs reviewed. Pt advised to call the office at [phone] or go straight to Labor and Delivery at [facility] ([place], [place]) with any of the following concerns vaginal bleeding, leaking of fluid, lola regularly Q 5-7 minutes for over an hour or not feeling the baby move.    RTO 3 weeks OBV/US      History of shoulder dystocia in prior pregnancy 11/28/2022     Priority: Medium     Overview Note:     With VAVD in 2020    PLAN: serial growth US in 3rd trimester       Assessment & Plan Note:     noted      History of gestational hypertension 11/28/2022     Priority: Medium     Overview Note:     PLAN: baby  mg daily at 12-36 weeks, close ob BP throughout preg       Assessment & Plan Note:     noted      Herpes labialis 08/26/2022     Priority: Medium     Overview Note:     11/28/22: Oral only, denies any genital lesions         Problem List Items Addressed This Visit        Other    History of gestational hypertension     noted         History of shoulder dystocia in prior pregnancy     noted         Multigravida in second trimester - Primary     PTL/labor precautions, [address], and pregnancy warning signs reviewed. Pt advised to call the office at [phone] or go straight to [facility] Delivery [facility] with any of the following concerns vaginal bleeding, leaking of fluid, lola regularly Q 5-7 minutes for over an hour or not feeling the baby move. RTO 3 weeks OBV/US         Relevant Orders    Alpha Fetoprotein, Maternal       Orders Placed This Encounter   Procedures    Alpha Fetoprotein, Maternal       Outpatient Encounter Medications as of 1/30/2023   Medication Sig Dispense Refill    Prenatal Vit-Fe Fumarate-FA (PRENATAL VITAMIN PO) Take 1 tablet by mouth daily      aspirin (ASPIRIN ADULT LOW STRENGTH) 81 MG chewable tablet Take 2 tablets by mouth daily 60 tablet 0    valACYclovir (VALTREX) 1 g tablet Take 1 tablet by mouth 2 times daily (Patient not taking: Reported on 1/30/2023) 8 tablet 2     No facility-administered encounter medications on file as of 1/30/2023.                Labor signs, pregnancy warning signs, and fetal movement counting reviewed (if applicable)        Elsie Valencia NP, APRN - CNP 01/30/23 9:04 AM

## 2023-01-30 NOTE — PATIENT INSTRUCTIONS
Thank you for coming. Return to the office in 3 weeks. Please call if you have any abdominal pain and 5 contractions in 1 hour, vaginal bleeding or spotting, or leaking of fluid.

## 2023-02-01 NOTE — PROGRESS NOTES
I have reviewed the patient's visit today including history, exam and assessment by RILEY Whitmore. I agree with treatment/plan as above.     Salvador Boone MD  11:16 AM  02/01/23

## 2023-02-02 LAB
AFP INTERP SERPL-IMP: NORMAL
AFP MOM SERPL: 1.51
AFP SERPL-MCNC: 80.9 NG/ML
AGE AT DELIVERY: 23 YR
COMMENT: NORMAL
GA METHOD: NORMAL
GA: 18.4 WEEKS
IDDM PATIENT QL: NO
Lab: NORMAL
MAT SCN FOR FETAL ABNORMALITIES SERPL: NORMAL
MULTIPLE PREGNANCY: NO
NEURAL TUBE DEFECT RISK FETUS: 2661

## 2023-02-27 ENCOUNTER — ROUTINE PRENATAL (OUTPATIENT)
Dept: OBGYN CLINIC | Age: 23
End: 2023-02-27
Payer: COMMERCIAL

## 2023-02-27 VITALS
DIASTOLIC BLOOD PRESSURE: 64 MMHG | BODY MASS INDEX: 23.56 KG/M2 | SYSTOLIC BLOOD PRESSURE: 118 MMHG | WEIGHT: 138 LBS | HEIGHT: 64 IN

## 2023-02-27 DIAGNOSIS — Z34.82 MULTIGRAVIDA IN SECOND TRIMESTER: ICD-10-CM

## 2023-02-27 DIAGNOSIS — Z36.89 ENCOUNTER FOR FETAL ANATOMIC SURVEY: Primary | ICD-10-CM

## 2023-02-27 DIAGNOSIS — Z87.59 HISTORY OF GESTATIONAL HYPERTENSION: ICD-10-CM

## 2023-02-27 DIAGNOSIS — Z87.59 HISTORY OF SHOULDER DYSTOCIA IN PRIOR PREGNANCY: ICD-10-CM

## 2023-02-27 DIAGNOSIS — B00.1 HERPES LABIALIS: ICD-10-CM

## 2023-02-27 PROCEDURE — 99213 OFFICE O/P EST LOW 20 MIN: CPT | Performed by: NURSE PRACTITIONER

## 2023-02-27 PROCEDURE — 76805 OB US >/= 14 WKS SNGL FETUS: CPT | Performed by: NURSE PRACTITIONER

## 2023-02-27 RX ORDER — AMOXICILLIN 500 MG/1
CAPSULE ORAL
COMMUNITY
Start: 2023-02-16

## 2023-02-27 SDOH — ECONOMIC STABILITY: HOUSING INSECURITY
IN THE LAST 12 MONTHS, WAS THERE A TIME WHEN YOU DID NOT HAVE A STEADY PLACE TO SLEEP OR SLEPT IN A SHELTER (INCLUDING NOW)?: NO

## 2023-02-27 SDOH — ECONOMIC STABILITY: INCOME INSECURITY: HOW HARD IS IT FOR YOU TO PAY FOR THE VERY BASICS LIKE FOOD, HOUSING, MEDICAL CARE, AND HEATING?: NOT HARD AT ALL

## 2023-02-27 SDOH — ECONOMIC STABILITY: FOOD INSECURITY: WITHIN THE PAST 12 MONTHS, YOU WORRIED THAT YOUR FOOD WOULD RUN OUT BEFORE YOU GOT MONEY TO BUY MORE.: NEVER TRUE

## 2023-02-27 SDOH — ECONOMIC STABILITY: FOOD INSECURITY: WITHIN THE PAST 12 MONTHS, THE FOOD YOU BOUGHT JUST DIDN'T LAST AND YOU DIDN'T HAVE MONEY TO GET MORE.: NEVER TRUE

## 2023-02-27 ASSESSMENT — PATIENT HEALTH QUESTIONNAIRE - PHQ9
SUM OF ALL RESPONSES TO PHQ QUESTIONS 1-9: 0
1. LITTLE INTEREST OR PLEASURE IN DOING THINGS: 0
SUM OF ALL RESPONSES TO PHQ QUESTIONS 1-9: 0
SUM OF ALL RESPONSES TO PHQ QUESTIONS 1-9: 0
2. FEELING DOWN, DEPRESSED OR HOPELESS: 0
SUM OF ALL RESPONSES TO PHQ QUESTIONS 1-9: 0
SUM OF ALL RESPONSES TO PHQ9 QUESTIONS 1 & 2: 0

## 2023-02-27 NOTE — PROGRESS NOTES
Patient comes in today for routine prenatal visit.    Patient needs dental note for wisdom teeth removal.     Fetal Movement: Yes  Contractions: No  Vaginal Bleeding: No  Leaking Fluid: No  GI/: No    Vitals:    02/27/23 0836   BP: 118/64   Site: Left Upper Arm   Position: Sitting   Weight: 138 lb (62.6 kg)   Height: 5' 4\" (1.626 m)

## 2023-02-27 NOTE — PROGRESS NOTES
This is a 25 y.o.  Trey Mon at 22w3d for routine OB visit. Her Estimated Due Date is 2023, by Ultrasound    Denies leaking of fluid, vaginal bleeding, or regular contractions. Reports fetal movement. Current Outpatient Medications on File Prior to Visit   Medication Sig Dispense Refill    Prenatal Vit-Fe Fumarate-FA (PRENATAL VITAMIN PO) Take 1 tablet by mouth daily      aspirin (ASPIRIN ADULT LOW STRENGTH) 81 MG chewable tablet Take 2 tablets by mouth daily 60 tablet 0    amoxicillin (AMOXIL) 500 MG capsule TAKE 1 CAPSULE EVERY 8 HOURS UNTIL GONE (Patient not taking: No sig reported)      valACYclovir (VALTREX) 1 g tablet Take 1 tablet by mouth 2 times daily (Patient not taking: No sig reported) 8 tablet 2     No current facility-administered medications on file prior to visit.        No Known Allergies    OB History    Para Term  AB Living   2 1 1 0 0 1   SAB IAB Ectopic Molar Multiple Live Births   0 0 0 0 0 1       # 1 - Date: 20, Sex: Male, Weight: 6 lb 13.7 oz (3.11 kg), GA: 37w2d, Delivery: Vaginal, Vacuum (Extractor), Apgar1: 9, Apgar5: 9, Living: Living, Birth Comments: None    # 2 - Date: None, Sex: None, Weight: None, GA: None, Delivery: None, Apgar1: None, Apgar5: None, Living: None, Birth Comments: None        Past Medical History:   Diagnosis Date    Anemia     Asthma     EIA, as child    Complication of anesthesia     irregular heart rhythm    Gestational hypertension     Intractable migraine with aura without status migrainosus 2022    Irregular heart beat     during surgery on knee    Migraine        Past Surgical History:   Procedure Laterality Date    ORTHOPEDIC SURGERY Left     ACL reconstruction    TONSILLECTOMY         Family History   Problem Relation Age of Onset    Seizures Maternal Grandmother     Hypertension Maternal Grandmother     Diabetes Maternal Aunt     Hypertension Maternal Aunt     Uterine Cancer Neg Hx     Ovarian Cancer Neg Hx     Colon Cancer Neg Hx     Breast Cancer Neg Hx        Social History     Socioeconomic History    Marital status: Single     Spouse name: Not on file    Number of children: Not on file    Years of education: Not on file    Highest education level: Not on file   Occupational History    Not on file   Tobacco Use    Smoking status: Never    Smokeless tobacco: Never   Substance and Sexual Activity    Alcohol use: Not Currently     Comment: occ    Drug use: Never    Sexual activity: Yes     Partners: Male     Birth control/protection: Implant   Other Topics Concern    Not on file   Social History Narrative    Lives with grandmother  Abuse: Feels safe at home, no history of physical abuse, no history of sexual abuse       Social Determinants of Health     Financial Resource Strain: Low Risk     Difficulty of Paying Living Expenses: Not hard at all   Food Insecurity: No Food Insecurity    Worried About Running Out of Food in the Last Year: Never true    Jacinto of Food in the Last Year: Never true   Transportation Needs: Unknown    Lack of Transportation (Medical): Not on file    Lack of Transportation (Non-Medical):  No   Physical Activity: Not on file   Stress: Not on file   Social Connections: Not on file   Intimate Partner Violence: Not on file   Housing Stability: Unknown    Unable to Pay for Housing in the Last Year: Not on file    Number of Places Lived in the Last Year: Not on file    Unstable Housing in the Last Year: No           Objective    Vitals:    02/27/23 0836   BP: 118/64   Site: Left Upper Arm   Position: Sitting   Weight: 138 lb (62.6 kg)   Height: 5' 4\" (1.626 m)       General: well developed, well nourished, in no acute distress    Head: normocephalic and atraumatic    Resp: even and unlabored    Psych: Normal mood and affect        Assessment and Plan      Patient Active Problem List    Diagnosis Date Noted    Multigravida in second trimester 11/28/2022     Priority: Medium Overview Note:     EDC by 9 3/7 week US - unknown LMP    7/16/2020: CF/SMA negative (prev pregnancy)  01/11/2023: NIPT neg x3, male       Assessment & Plan Note:     PTL/labor precautions, [address], and pregnancy warning signs reviewed. Pt advised to call the office at [phone] or go straight to Labor and Delivery at [facility] with any of the following concerns vaginal bleeding, leaking of fluid, lola regularly Q 5-7 minutes for over an hour or not feeling the baby move. RTO 4 weeks  Anatomy US today nl  Dental letter provided      History of shoulder dystocia in prior pregnancy 11/28/2022     Priority: Medium     Overview Note:     With VAVD in 2020    PLAN: serial growth US in 3rd trimester       Assessment & Plan Note:     noted      History of gestational hypertension 11/28/2022     Priority: Medium     Overview Note:     PLAN: baby  mg daily at 12-36 weeks, close ob BP throughout preg       Assessment & Plan Note:     noted      Herpes labialis 08/26/2022     Priority: Medium     Overview Note:     11/28/22: Oral only, denies any genital lesions       Assessment & Plan Note:     noted         Problem List Items Addressed This Visit        Digestive    Herpes labialis     noted            Other    History of gestational hypertension     noted         Relevant Orders    AMB POC US OB >= 14 WKS, 1ST GESTATION (Completed)    History of shoulder dystocia in prior pregnancy     noted         Relevant Orders    AMB POC US OB >= 14 WKS, 1ST GESTATION (Completed)    Multigravida in second trimester     PTL/labor precautions, [address], and pregnancy warning signs reviewed. Pt advised to call the office at [phone] or go straight to Labor and Delivery at [facility] with any of the following concerns vaginal bleeding, leaking of fluid, lola regularly Q 5-7 minutes for over an hour or not feeling the baby move.    RTO 4 weeks  Anatomy US today nl  Dental letter provided Relevant Orders    AMB POC US OB >= 14 WKS, 1ST GESTATION (Completed)   Other Visit Diagnoses     Encounter for fetal anatomic survey    -  Primary    Relevant Orders    AMB POC US OB >= 14 WKS, 1ST GESTATION (Completed)          Orders Placed This Encounter   Procedures    AMB POC US OB >= 14 WKS, 1ST GESTATION       Outpatient Encounter Medications as of 2/27/2023   Medication Sig Dispense Refill    Prenatal Vit-Fe Fumarate-FA (PRENATAL VITAMIN PO) Take 1 tablet by mouth daily      aspirin (ASPIRIN ADULT LOW STRENGTH) 81 MG chewable tablet Take 2 tablets by mouth daily 60 tablet 0    amoxicillin (AMOXIL) 500 MG capsule TAKE 1 CAPSULE EVERY 8 HOURS UNTIL GONE (Patient not taking: No sig reported)      valACYclovir (VALTREX) 1 g tablet Take 1 tablet by mouth 2 times daily (Patient not taking: No sig reported) 8 tablet 2     No facility-administered encounter medications on file as of 2/27/2023.                Labor signs, pregnancy warning signs, and fetal movement counting reviewed (if applicable)        Diandra Smith, MONICA - CNP 02/27/23 8:48 AM

## 2023-02-27 NOTE — ASSESSMENT & PLAN NOTE
PTL/labor precautions, [facility], and pregnancy warning signs reviewed. Pt advised to call the office at [phone] or go straight to Labor and Delivery at [facility] with any of the following concerns vaginal bleeding, leaking of fluid, lola regularly Q 5-7 minutes for over an hour or not feeling the baby move.    RTO 4 weeks  Anatomy US today nl  Dental letter provided

## 2023-02-28 NOTE — PROGRESS NOTES
I have reviewed the patient's visit today including history, exam and assessment by RILEY Santos. I agree with treatment/plan as above.     Sunshine Snider MD  8:27 AM  02/28/23

## 2023-03-27 ENCOUNTER — ROUTINE PRENATAL (OUTPATIENT)
Dept: OBGYN CLINIC | Age: 23
End: 2023-03-27

## 2023-03-27 VITALS
SYSTOLIC BLOOD PRESSURE: 122 MMHG | WEIGHT: 144 LBS | BODY MASS INDEX: 24.59 KG/M2 | DIASTOLIC BLOOD PRESSURE: 64 MMHG | HEIGHT: 64 IN

## 2023-03-27 DIAGNOSIS — Z87.59 HISTORY OF GESTATIONAL HYPERTENSION: ICD-10-CM

## 2023-03-27 DIAGNOSIS — Z34.82 MULTIGRAVIDA IN SECOND TRIMESTER: ICD-10-CM

## 2023-03-27 DIAGNOSIS — Z87.59 HISTORY OF SHOULDER DYSTOCIA IN PRIOR PREGNANCY: ICD-10-CM

## 2023-03-27 PROCEDURE — 99902 PR PRENATAL VISIT: CPT | Performed by: NURSE PRACTITIONER

## 2023-03-27 ASSESSMENT — PATIENT HEALTH QUESTIONNAIRE - PHQ9
SUM OF ALL RESPONSES TO PHQ QUESTIONS 1-9: 0
SUM OF ALL RESPONSES TO PHQ9 QUESTIONS 1 & 2: 0
2. FEELING DOWN, DEPRESSED OR HOPELESS: 0
1. LITTLE INTEREST OR PLEASURE IN DOING THINGS: 0
SUM OF ALL RESPONSES TO PHQ QUESTIONS 1-9: 0

## 2023-03-27 NOTE — PROGRESS NOTES
I have reviewed the patient's visit today including history, exam and assessment by RILEY Sanchez. I agree with treatment/plan as above.     Caitlyn Varela MD  9:02 AM  03/27/23
Patient comes in today for routine prenatal visit. No complaints/concerns today.      Fetal Movement: Yes  Contractions: No  Vaginal Bleeding: No  Leaking Fluid: No  GI/: No    Vitals:    03/27/23 0845   BP: 122/64   Site: Right Upper Arm   Position: Sitting   Weight: 144 lb (65.3 kg)   Height: 5' 4\" (1.626 m)
tablet by mouth daily      aspirin (ASPIRIN ADULT LOW STRENGTH) 81 MG chewable tablet Take 2 tablets by mouth daily 60 tablet 0    valACYclovir (VALTREX) 1 g tablet Take 1 tablet by mouth 2 times daily (Patient not taking: No sig reported) 8 tablet 2     No facility-administered encounter medications on file as of 3/27/2023.                Labor signs, pregnancy warning signs, and fetal movement counting reviewed (if applicable)        MONICA Mcallister - CNP 03/27/23 9:01 AM

## 2023-03-27 NOTE — ASSESSMENT & PLAN NOTE
PTL/labor precautions, [address], and pregnancy warning signs reviewed. Pt advised to call the office at [phone] or go straight to Labor and Delivery at [facility] with any of the following concerns vaginal bleeding, leaking of fluid, lola regularly Q 5-7 minutes for over an hour or not feeling the baby move.    RTO 2 weeks OBV, US, glucola, cbc and tdap

## 2023-04-10 DIAGNOSIS — Z34.83 MULTIGRAVIDA IN THIRD TRIMESTER: ICD-10-CM

## 2023-04-10 LAB
BASOPHILS # BLD: 0 K/UL (ref 0–0.2)
BASOPHILS NFR BLD: 0 % (ref 0–2)
DIFFERENTIAL METHOD BLD: ABNORMAL
EOSINOPHIL # BLD: 0.1 K/UL (ref 0–0.8)
EOSINOPHIL NFR BLD: 1 % (ref 0.5–7.8)
ERYTHROCYTE [DISTWIDTH] IN BLOOD BY AUTOMATED COUNT: 13.1 % (ref 11.9–14.6)
HCT VFR BLD AUTO: 32.4 % (ref 35.8–46.3)
HGB BLD-MCNC: 10.9 G/DL (ref 11.7–15.4)
IMM GRANULOCYTES # BLD AUTO: 0.1 K/UL (ref 0–0.5)
IMM GRANULOCYTES NFR BLD AUTO: 1 % (ref 0–5)
LYMPHOCYTES # BLD: 1.1 K/UL (ref 0.5–4.6)
LYMPHOCYTES NFR BLD: 11 % (ref 13–44)
MCH RBC QN AUTO: 31.4 PG (ref 26.1–32.9)
MCHC RBC AUTO-ENTMCNC: 33.6 G/DL (ref 31.4–35)
MCV RBC AUTO: 93.4 FL (ref 82–102)
MONOCYTES # BLD: 0.4 K/UL (ref 0.1–1.3)
MONOCYTES NFR BLD: 4 % (ref 4–12)
NEUTS SEG # BLD: 8.1 K/UL (ref 1.7–8.2)
NEUTS SEG NFR BLD: 83 % (ref 43–78)
NRBC # BLD: 0 K/UL (ref 0–0.2)
PLATELET # BLD AUTO: 220 K/UL (ref 150–450)
PMV BLD AUTO: 11.4 FL (ref 9.4–12.3)
RBC # BLD AUTO: 3.47 M/UL (ref 4.05–5.2)
WBC # BLD AUTO: 9.8 K/UL (ref 4.3–11.1)

## 2023-04-11 PROBLEM — O99.013 ANEMIA DURING PREGNANCY IN THIRD TRIMESTER: Status: ACTIVE | Noted: 2023-04-11

## 2023-04-12 LAB — GLUCOSE 1 HOUR: 110 MG/DL

## 2023-04-24 ENCOUNTER — ROUTINE PRENATAL (OUTPATIENT)
Dept: OBGYN CLINIC | Age: 23
End: 2023-04-24

## 2023-04-24 VITALS
BODY MASS INDEX: 25.27 KG/M2 | DIASTOLIC BLOOD PRESSURE: 64 MMHG | SYSTOLIC BLOOD PRESSURE: 118 MMHG | HEIGHT: 64 IN | WEIGHT: 148 LBS

## 2023-04-24 DIAGNOSIS — Z87.59 HISTORY OF SHOULDER DYSTOCIA IN PRIOR PREGNANCY: ICD-10-CM

## 2023-04-24 DIAGNOSIS — Z34.83 MULTIGRAVIDA IN THIRD TRIMESTER: ICD-10-CM

## 2023-04-24 DIAGNOSIS — O99.013 ANEMIA DURING PREGNANCY IN THIRD TRIMESTER: ICD-10-CM

## 2023-04-24 DIAGNOSIS — Z87.59 HISTORY OF GESTATIONAL HYPERTENSION: ICD-10-CM

## 2023-04-24 PROCEDURE — 99902 PR PRENATAL VISIT: CPT | Performed by: NURSE PRACTITIONER

## 2023-04-24 ASSESSMENT — PATIENT HEALTH QUESTIONNAIRE - PHQ9
2. FEELING DOWN, DEPRESSED OR HOPELESS: 0
SUM OF ALL RESPONSES TO PHQ QUESTIONS 1-9: 0
SUM OF ALL RESPONSES TO PHQ9 QUESTIONS 1 & 2: 0
1. LITTLE INTEREST OR PLEASURE IN DOING THINGS: 0
SUM OF ALL RESPONSES TO PHQ QUESTIONS 1-9: 0

## 2023-04-24 NOTE — ASSESSMENT & PLAN NOTE
PTL/labor precautions, [address], and pregnancy warning signs reviewed. Pt advised to call the office at [phone] or go straight to Labor and Delivery at [facility] with any of the following concerns vaginal bleeding, leaking of fluid, lola regularly Q 5-7 minutes for over an hour or not feeling the baby move.    RTO 2 weeks OBV with US

## 2023-04-24 NOTE — PROGRESS NOTES
Patient comes in today for routine prenatal visit. Fetal Movement: Yes  Contractions: No  Vaginal Bleeding: No  Leaking Fluid: No  GI/: Yes-heartburn since starting  iron pills, patient will  Pepcid or tums today.      Vitals:    04/24/23 0918   BP: 118/64   Site: Left Upper Arm   Position: Sitting   Weight: 148 lb (67.1 kg)   Height: 5' 4\" (1.626 m)

## 2023-04-24 NOTE — PROGRESS NOTES
This is a 25 y.o.   at 30w3d for routine OB visit. Her Estimated Due Date is 2023, by Ultrasound    Denies leaking of fluid, vaginal bleeding, or regular contractions. Reports fetal movement. Current Outpatient Medications on File Prior to Visit   Medication Sig Dispense Refill    ferrous sulfate (IRON 325) 325 (65 Fe) MG tablet Take 1 tablet by mouth 2 times daily 60 tablet 5    docusate sodium (COLACE) 100 MG capsule Take 1 capsule by mouth 2 times daily 60 capsule 5    Prenatal Vit-Fe Fumarate-FA (PRENATAL VITAMIN PO) Take 1 tablet by mouth daily      aspirin (ASPIRIN ADULT LOW STRENGTH) 81 MG chewable tablet Take 2 tablets by mouth daily 60 tablet 0    valACYclovir (VALTREX) 1 g tablet Take 1 tablet by mouth 2 times daily (Patient not taking: Reported on 4/10/2023) 8 tablet 2     No current facility-administered medications on file prior to visit.        No Known Allergies    OB History    Para Term  AB Living   2 1 1 0 0 1   SAB IAB Ectopic Molar Multiple Live Births   0 0 0 0 0 1       # 1 - Date: 20, Sex: Male, Weight: 6 lb 13.7 oz (3.11 kg), GA: 37w2d, Delivery: Vaginal, Vacuum (Extractor), Apgar1: 9, Apgar5: 9, Living: Living, Birth Comments: None    # 2 - Date: None, Sex: None, Weight: None, GA: None, Delivery: None, Apgar1: None, Apgar5: None, Living: None, Birth Comments: None        Past Medical History:   Diagnosis Date    Anemia     Asthma     EIA, as child    Complication of anesthesia     irregular heart rhythm    Gestational hypertension     Intractable migraine with aura without status migrainosus 2022    Irregular heart beat     during surgery on knee    Migraine        Past Surgical History:   Procedure Laterality Date    ORTHOPEDIC SURGERY Left     ACL reconstruction    TONSILLECTOMY         Family History   Problem Relation Age of Onset    Seizures Maternal Grandmother     Hypertension Maternal Grandmother     Diabetes Maternal Aunt

## 2023-04-24 NOTE — PROGRESS NOTES
I have reviewed the patient's visit today including history, exam and assessment by RILEY Hahn. I agree with treatment/plan as above.     Juventino Judge MD  9:44 AM  04/24/23

## 2023-05-02 ENCOUNTER — TELEPHONE (OUTPATIENT)
Dept: OBGYN CLINIC | Age: 23
End: 2023-05-02

## 2023-05-02 NOTE — TELEPHONE ENCOUNTER
Telephone Call Chief Complaint:    Called patient back, patient confirmed having n/v this morning and then a few hours after n/v episode she developed a HA that has been persistent throughout the day accompanied with feeling flushed and \"out of it\" per patient. Patient denies blurry vision, dizziness, SOB, or chest pain during phone call. Patient states hx of Pre-e in last pregnancy and [facility] during this telephone encounter    Patient knows to head to [facility] ED to be seen if [facility] is high [facility] or if [facility] does not have the resources to check patient BP. Patient also know other s/s to look out for the consistent an ED or OB ED encounter.      Fetal movement: yes   Leaking of Fluids: no  Vaginal Bleeding: no  Contractions: yes-      Angelika Maloney, RN  5/2/2023

## 2023-05-15 ENCOUNTER — ROUTINE PRENATAL (OUTPATIENT)
Dept: OBGYN CLINIC | Age: 23
End: 2023-05-15
Payer: COMMERCIAL

## 2023-05-15 VITALS
WEIGHT: 150 LBS | BODY MASS INDEX: 25.61 KG/M2 | DIASTOLIC BLOOD PRESSURE: 70 MMHG | SYSTOLIC BLOOD PRESSURE: 118 MMHG | HEIGHT: 64 IN

## 2023-05-15 DIAGNOSIS — O99.013 ANEMIA DURING PREGNANCY IN THIRD TRIMESTER: ICD-10-CM

## 2023-05-15 DIAGNOSIS — Z34.83 MULTIGRAVIDA IN THIRD TRIMESTER: ICD-10-CM

## 2023-05-15 DIAGNOSIS — Z87.59 HISTORY OF GESTATIONAL HYPERTENSION: ICD-10-CM

## 2023-05-15 DIAGNOSIS — Z87.59 HISTORY OF SHOULDER DYSTOCIA IN PRIOR PREGNANCY: Primary | ICD-10-CM

## 2023-05-15 PROCEDURE — 76816 OB US FOLLOW-UP PER FETUS: CPT | Performed by: NURSE PRACTITIONER

## 2023-05-15 PROCEDURE — 99213 OFFICE O/P EST LOW 20 MIN: CPT | Performed by: NURSE PRACTITIONER

## 2023-05-15 ASSESSMENT — PATIENT HEALTH QUESTIONNAIRE - PHQ9
SUM OF ALL RESPONSES TO PHQ QUESTIONS 1-9: 0
SUM OF ALL RESPONSES TO PHQ9 QUESTIONS 1 & 2: 0
1. LITTLE INTEREST OR PLEASURE IN DOING THINGS: 0
SUM OF ALL RESPONSES TO PHQ QUESTIONS 1-9: 0
SUM OF ALL RESPONSES TO PHQ QUESTIONS 1-9: 0
2. FEELING DOWN, DEPRESSED OR HOPELESS: 0
SUM OF ALL RESPONSES TO PHQ QUESTIONS 1-9: 0

## 2023-05-15 NOTE — ASSESSMENT & PLAN NOTE
PTL/labor precautions, [address], and pregnancy warning signs reviewed. Pt advised to call the office at [phone] or go straight to Labor and Delivery at [facility] with any of the following concerns vaginal bleeding, leaking of fluid, lola regularly Q 5-7 minutes for over an hour or not feeling the baby move.    RTO 2 weeks

## 2023-05-15 NOTE — PROGRESS NOTES
Patient comes in today for routine prenatal visit. No complaints/concerns today.      Fetal Movement: Yes  Contractions: No  Vaginal Bleeding: No  Leaking Fluid: No  GI/: No    Vitals:    05/15/23 1135   BP: 118/70   Site: Left Upper Arm   Position: Sitting   Weight: 150 lb (68 kg)   Height: 5' 4\" (1.626 m)

## 2023-05-15 NOTE — ASSESSMENT & PLAN NOTE
hx of shoulder dystocia at 37 weeks, 6lb 14oz. EFW today normal. We review risks of shoulder dystocia including permanenet nerve  injury to baby, operative vaginal delivery, perineal damage, risk for stat C/S, and mobidity and mortality risk.  Will plan repeat growth US in 4 weeks

## 2023-05-15 NOTE — PROGRESS NOTES
This is a 25 y.o.   at 33w3d for routine OB visit. Her Estimated Due Date is 2023, by Ultrasound    Denies leaking of fluid, vaginal bleeding, or regular contractions. Reports fetal movement. Current Outpatient Medications on File Prior to Visit   Medication Sig Dispense Refill    ferrous sulfate (IRON 325) 325 (65 Fe) MG tablet Take 1 tablet by mouth 2 times daily 60 tablet 5    Prenatal Vit-Fe Fumarate-FA (PRENATAL VITAMIN PO) Take 1 tablet by mouth daily      aspirin (ASPIRIN ADULT LOW STRENGTH) 81 MG chewable tablet Take 2 tablets by mouth daily 60 tablet 0    valACYclovir (VALTREX) 1 g tablet Take 1 tablet by mouth 2 times daily (Patient not taking: Reported on 4/10/2023) 8 tablet 2     No current facility-administered medications on file prior to visit.        No Known Allergies    OB History    Para Term  AB Living   2 1 1 0 0 1   SAB IAB Ectopic Molar Multiple Live Births   0 0 0 0 0 1       # 1 - Date: 20, Sex: Male, Weight: 6 lb 13.7 oz (3.11 kg), GA: 37w2d, Delivery: Vaginal, Vacuum (Extractor), Apgar1: 9, Apgar5: 9, Living: Living, Birth Comments: None    # 2 - Date: None, Sex: None, Weight: None, GA: None, Delivery: None, Apgar1: None, Apgar5: None, Living: None, Birth Comments: None        Past Medical History:   Diagnosis Date    Anemia     Asthma     EIA, as child    Complication of anesthesia     irregular heart rhythm    Gestational hypertension     Intractable migraine with aura without status migrainosus 2022    Irregular heart beat     during surgery on knee    Migraine        Past Surgical History:   Procedure Laterality Date    ORTHOPEDIC SURGERY Left     ACL reconstruction    TONSILLECTOMY         Family History   Problem Relation Age of Onset    Seizures Maternal Grandmother     Hypertension Maternal Grandmother     Diabetes Maternal Aunt     Hypertension Maternal Aunt     Uterine Cancer Neg Hx     Ovarian Cancer Neg Hx    

## 2023-05-16 NOTE — PROGRESS NOTES
I have reviewed the patient's visit today including history, exam and assessment by RILEY Brown. I agree with treatment/plan as above.     Martin Madrigal MD  8:07 AM  05/16/23

## 2023-05-25 ENCOUNTER — HOSPITAL ENCOUNTER (OUTPATIENT)
Age: 23
Discharge: HOME | End: 2023-05-25
Attending: OBSTETRICS & GYNECOLOGY | Admitting: OBSTETRICS & GYNECOLOGY
Payer: COMMERCIAL

## 2023-05-25 VITALS
HEART RATE: 94 BPM | RESPIRATION RATE: 20 BRPM | HEIGHT: 64 IN | OXYGEN SATURATION: 100 % | BODY MASS INDEX: 25.61 KG/M2 | TEMPERATURE: 98.5 F | SYSTOLIC BLOOD PRESSURE: 115 MMHG | DIASTOLIC BLOOD PRESSURE: 65 MMHG | WEIGHT: 150 LBS

## 2023-05-25 LAB
ALBUMIN SERPL-MCNC: 2.8 G/DL (ref 3.5–5)
ALBUMIN/GLOB SERPL: 0.7 (ref 0.4–1.6)
ALP SERPL-CCNC: 76 U/L (ref 50–130)
ALT SERPL-CCNC: 15 U/L (ref 12–65)
AST SERPL-CCNC: 15 U/L (ref 15–37)
BILIRUB DIRECT SERPL-MCNC: 0.1 MG/DL
BILIRUB SERPL-MCNC: 0.4 MG/DL (ref 0.2–1.1)
BUN SERPL-MCNC: 4 MG/DL (ref 6–23)
CREAT SERPL-MCNC: 0.49 MG/DL (ref 0.6–1)
CREAT UR-MCNC: 126 MG/DL
ERYTHROCYTE [DISTWIDTH] IN BLOOD BY AUTOMATED COUNT: 12.8 % (ref 11.9–14.6)
GLOBULIN SER CALC-MCNC: 4.2 G/DL (ref 2.8–4.5)
HCT VFR BLD AUTO: 33.8 % (ref 35.8–46.3)
HGB BLD-MCNC: 11.4 G/DL (ref 11.7–15.4)
LDH SERPL L TO P-CCNC: 172 U/L (ref 100–190)
MCH RBC QN AUTO: 30.4 PG (ref 26.1–32.9)
MCHC RBC AUTO-ENTMCNC: 33.7 G/DL (ref 31.4–35)
MCV RBC AUTO: 90.1 FL (ref 82–102)
NRBC # BLD: 0 K/UL (ref 0–0.2)
PLATELET # BLD AUTO: 243 K/UL (ref 150–450)
PMV BLD AUTO: 10.2 FL (ref 9.4–12.3)
PROT SERPL-MCNC: 7 G/DL (ref 6.3–8.2)
PROT UR-MCNC: 25 MG/DL
PROT/CREAT UR-RTO: 0.2
RBC # BLD AUTO: 3.75 M/UL (ref 4.05–5.2)
WBC # BLD AUTO: 9.1 K/UL (ref 4.3–11.1)

## 2023-05-25 PROCEDURE — 59025 FETAL NON-STRESS TEST: CPT

## 2023-05-25 PROCEDURE — 80076 HEPATIC FUNCTION PANEL: CPT

## 2023-05-25 PROCEDURE — 85027 COMPLETE CBC AUTOMATED: CPT

## 2023-05-25 PROCEDURE — 83615 LACTATE (LD) (LDH) ENZYME: CPT

## 2023-05-25 PROCEDURE — 99283 EMERGENCY DEPT VISIT LOW MDM: CPT

## 2023-05-25 PROCEDURE — 84520 ASSAY OF UREA NITROGEN: CPT

## 2023-05-25 PROCEDURE — 82570 ASSAY OF URINE CREATININE: CPT

## 2023-05-25 PROCEDURE — 84156 ASSAY OF PROTEIN URINE: CPT

## 2023-05-25 PROCEDURE — 82565 ASSAY OF CREATININE: CPT

## 2023-05-25 RX ORDER — DOCUSATE SODIUM 100 MG/1
100 CAPSULE, LIQUID FILLED ORAL 2 TIMES DAILY
COMMUNITY

## 2023-05-25 NOTE — H&P
History   Problem Relation Age of Onset    Seizures Maternal Grandmother     Hypertension Maternal Grandmother     Diabetes Maternal Aunt     Hypertension Maternal Aunt     Uterine Cancer Neg Hx     Ovarian Cancer Neg Hx     Colon Cancer Neg Hx     Breast Cancer Neg Hx        No Known Allergies  Prior to Admission medications    Medication Sig Start Date End Date Taking? Authorizing Provider   docusate sodium (COLACE) 100 MG capsule Take 1 capsule by mouth 2 times daily   Yes Historical Provider, MD   ferrous sulfate (IRON 325) 325 (65 Fe) MG tablet Take 1 tablet by mouth 2 times daily 23   Ham Bajwa MD   Prenatal Vit-Fe Fumarate-FA (PRENATAL VITAMIN PO) Take 1 tablet by mouth daily    Historical Provider, MD   aspirin (ASPIRIN ADULT LOW STRENGTH) 81 MG chewable tablet Take 2 tablets by mouth daily 22   MONICA Espinosa - CNP   valACYclovir (VALTREX) 1 g tablet Take 1 tablet by mouth 2 times daily  Patient not taking: Reported on 4/10/2023 9/28/22   Iesha Valero DO        Review of Systems:  Complete review of systems performed. Those not specifically mentioned in the HPI are either negative are non related to this patient encounter. Objective:     Vitals:    Vitals:    23 1039   BP: 115/67   Pulse: 91   Resp: 20   Temp: 98.5 °F (36.9 °C)   TempSrc: Oral   SpO2: 100%   Weight: 150 lb (68 kg)   Height: 5' 4\" (1.626 m)      Temp (24hrs), Av.5 °F (36.9 °C), Min:98.5 °F (36.9 °C), Max:98.5 °F (36.9 °C)    BP  Min: 115/67  Max: 115/67       Physical Exam:  Heart: RRR  Lungs: cta bl  Adb: soft, nt nd, gravid  Ext: no edema noted  CVX: deferred  Membranes:  Intact  Uterine Activity:  None  Fetal Heart Rate:  Reactive       Lab/Data Review:  No results found for this or any previous visit (from the past 24 hour(s)). Assessment and Plan:   34w6d with initial complaints of elevated BP at home. No symptoms at this time.   Will get baseline labs but likely will dc home after

## 2023-05-25 NOTE — PROGRESS NOTES
Discharge instructions reviewed with pt. Strict pre-E precautions given. Pt verbalizes understanding. No questions at this time. Sent home in stable condition with FOB at side.

## 2023-05-25 NOTE — PROGRESS NOTES
Pt presents to [facility] with c/o high blood pressure using wrist cuff at home. EFM/toco applied. VSS.  Dr. David Mcdonald notified of pt arrival.

## 2023-05-25 NOTE — DISCHARGE INSTRUCTIONS
you have any problems. Where can you learn more? Go to http://www.woods.com/ and enter Y951 to learn more about \"Pregnancy Precautions: Care Instructions. \"  Current as of: November 9, 2022               Content Version: 13.6  © 5009-4978 [employer], Incorporated. Care instructions adapted under license by [facility] ([facility]). If you have questions about a medical condition or this instruction, always ask your healthcare professional. [address] any warranty or liability for your use of this information.

## 2023-05-29 ENCOUNTER — HOSPITAL ENCOUNTER (OUTPATIENT)
Age: 23
Discharge: HOME | End: 2023-05-29
Attending: OBSTETRICS & GYNECOLOGY | Admitting: OBSTETRICS & GYNECOLOGY
Payer: COMMERCIAL

## 2023-05-29 VITALS
OXYGEN SATURATION: 99 % | DIASTOLIC BLOOD PRESSURE: 66 MMHG | RESPIRATION RATE: 18 BRPM | HEART RATE: 83 BPM | SYSTOLIC BLOOD PRESSURE: 106 MMHG | TEMPERATURE: 98.4 F

## 2023-05-29 PROCEDURE — 87186 SC STD MICRODIL/AGAR DIL: CPT

## 2023-05-29 PROCEDURE — 99283 EMERGENCY DEPT VISIT LOW MDM: CPT

## 2023-05-29 PROCEDURE — 59025 FETAL NON-STRESS TEST: CPT

## 2023-05-29 PROCEDURE — 87088 URINE BACTERIA CULTURE: CPT

## 2023-05-29 PROCEDURE — 87086 URINE CULTURE/COLONY COUNT: CPT

## 2023-05-29 PROCEDURE — 6370000000 HC RX 637 (ALT 250 FOR IP): Performed by: OBSTETRICS & GYNECOLOGY

## 2023-05-29 RX ORDER — BUTALBITAL, ACETAMINOPHEN AND CAFFEINE 50; 325; 40 MG/1; MG/1; MG/1
2 TABLET ORAL ONCE
Status: COMPLETED | OUTPATIENT
Start: 2023-05-29 | End: 2023-05-29

## 2023-05-29 RX ORDER — NITROFURANTOIN 25; 75 MG/1; MG/1
100 CAPSULE ORAL 2 TIMES DAILY
Qty: 10 CAPSULE | Refills: 0 | Status: SHIPPED | OUTPATIENT
Start: 2023-05-29 | End: 2023-06-03

## 2023-05-29 RX ADMIN — BUTALBITAL, ACETAMINOPHEN, AND CAFFEINE 2 TABLET: 325; 50; 40 TABLET ORAL at 16:36

## 2023-05-29 NOTE — ED TRIAGE NOTES
110/63  Max: 110/63       Physical Exam:  Heart: RRR  Lungs: cta bl  Adb: soft, nt nd, gravid  Ext: no edema noted  CVX: deferred  Uterine Activity:  irritability, no regular ctx pattern  Fetal Heart Rate:  Reactive  Baseline: 140s per minute  Variability: moderate  Accelerations: yes  Decelerations: none       Lab/Data Review:  Urine dip- trace blood, 30 mg protein, positive nitrites, moderate leukocytes    Assessment and Plan:   35w3d with initial complaints of headache in setting of history of gest HTN,  UTI      - Advised patient BP in triage is normal and we reviewed clinical concern if >=140/90  - HA treated with Fioricet (2 tabs) in triage dept  - Continue baby ASA until 36 weeks  - Urine dip highly suggestive of UTI and will treat with macrobid x 5 days pending culture  - Keep f/u appt at [facility] office  - Pre-eclampsia precautions reviewed and advised patient I don't feel repeat labs necessary today as serial BP wnl and normal PIH labs on 5/25 with P/C = 0.2, but consider repeat [address] labs at [facility] f/u appt if ongoing clinical concerns

## 2023-05-29 NOTE — PROGRESS NOTES
Patient discharged home per MD order. Discharge instructions completed and patient verbalized understanding. Questions encouraged. Accompanied by significant. Stable at discharge.

## 2023-05-29 NOTE — DISCHARGE INSTRUCTIONS
High Blood Pressure in Pregnancy: Care Instructions  Overview     High blood pressure (hypertension) means that the force of blood against your artery walls is too strong. High blood pressure problems during pregnancy include:  Chronic hypertension. This is high blood pressure that starts before pregnancy. Gestational hypertension. This is high blood pressure that starts in the second or third trimester of pregnancy. Preeclampsia. This is a problem that includes high blood pressure and signs of organ injury during pregnancy. In some cases, it leads to eclampsia. Eclampsia causes seizures. High blood pressure during pregnancy can affect the amount of oxygen and nutrients your baby receives. This can affect how your baby grows. High blood pressure can also cause other serious problems for both you and your baby, such as placental abruption. To prevent problems, you and your baby will be watched very closely. You will have to check your blood pressure often during pregnancy and possibly after pregnancy. If your blood pressure rises suddenly or is very high during your pregnancy, your doctor may prescribe medicines. They can usually control blood pressure. If your blood pressure affects your or your baby's health, your doctor may need to deliver your baby early. After your baby is born, your blood pressure will probably improve. But sometimes blood pressure problems continue after birth. Follow-up care is a key part of your treatment and safety. Be sure to make and go to all appointments, and call your doctor if you are having problems. It's also a good idea to know your test results and keep a list of the medicines you take. How can you care for yourself at home? Take and write down your blood pressure at home if your doctor says to. Take your medicines exactly as prescribed. Call your doctor if you think you are having a problem with your medicine. Do not smoke.  This is one of the best things you can

## 2023-06-01 ENCOUNTER — ROUTINE PRENATAL (OUTPATIENT)
Dept: OBGYN CLINIC | Age: 23
End: 2023-06-01

## 2023-06-01 VITALS
DIASTOLIC BLOOD PRESSURE: 60 MMHG | BODY MASS INDEX: 26.46 KG/M2 | SYSTOLIC BLOOD PRESSURE: 128 MMHG | WEIGHT: 155 LBS | HEIGHT: 64 IN

## 2023-06-01 DIAGNOSIS — N89.8 VAGINAL DISCHARGE DURING PREGNANCY IN THIRD TRIMESTER: ICD-10-CM

## 2023-06-01 DIAGNOSIS — Z87.59 HISTORY OF GESTATIONAL HYPERTENSION: ICD-10-CM

## 2023-06-01 DIAGNOSIS — O99.013 ANEMIA DURING PREGNANCY IN THIRD TRIMESTER: ICD-10-CM

## 2023-06-01 DIAGNOSIS — Z34.83 MULTIGRAVIDA IN THIRD TRIMESTER: Primary | ICD-10-CM

## 2023-06-01 DIAGNOSIS — O26.893 VAGINAL DISCHARGE DURING PREGNANCY IN THIRD TRIMESTER: ICD-10-CM

## 2023-06-01 DIAGNOSIS — Z87.59 HISTORY OF SHOULDER DYSTOCIA IN PRIOR PREGNANCY: ICD-10-CM

## 2023-06-01 LAB
BACTERIA SPEC CULT: ABNORMAL
BACTERIA SPEC CULT: ABNORMAL
SERVICE CMNT-IMP: ABNORMAL

## 2023-06-01 ASSESSMENT — PATIENT HEALTH QUESTIONNAIRE - PHQ9
SUM OF ALL RESPONSES TO PHQ QUESTIONS 1-9: 0
SUM OF ALL RESPONSES TO PHQ9 QUESTIONS 1 & 2: 0
2. FEELING DOWN, DEPRESSED OR HOPELESS: 0
SUM OF ALL RESPONSES TO PHQ QUESTIONS 1-9: 0
1. LITTLE INTEREST OR PLEASURE IN DOING THINGS: 0
SUM OF ALL RESPONSES TO PHQ QUESTIONS 1-9: 0
SUM OF ALL RESPONSES TO PHQ QUESTIONS 1-9: 0

## 2023-06-01 NOTE — PATIENT INSTRUCTIONS
If you develop any symptoms of preeclampsia like headache that won't go away with Tylenol, nausea and vomiting, blurry vision and/or pain in the upper right side of your stomach, please call our office or seek immediate care. If you develop signs and symptoms of  labor including but not limited to regular uterine contractions every 5-7 minutes for 1 hour, vaginal bleeding or leakage of fluid please contact our office and/or seek immediate care. Thanks for coming to see us today and letting us take care of you!

## 2023-06-01 NOTE — PROGRESS NOTES
Patient comes in today for routine prenatal visit.      Fetal Movement: Yes  Contractions: Yes, irregular  Vaginal Bleeding: No  Leaking Fluid: Yes  GI/: Yes, frequent urination (patient getting antibiotics from pharmacy)    Vitals:    06/01/23 1101   BP: 128/60   Site: Left Upper Arm   Position: Sitting   Weight: 155 lb (70.3 kg)   Height: 5' 4\" (1.626 m)
third trimester 2023     Overview Note:     Additional Fe       Assessment & Plan Note:     CBC today        Problem List Items Addressed This Visit        Other    Multigravida in third trimester - Primary     Educated patient of signs and symptoms of  labor including but not limited to regular uterine contractions every 5-7 minutes for 1 hour, vaginal bleeding or leakage of fluid to seek immediate care.           Relevant Orders    CBC with Auto Differential    Culture, Strep B Screen, Vaginal/Rectal    History of shoulder dystocia in prior pregnancy     noted         History of gestational hypertension     BP wnl again today         Anemia during pregnancy in third trimester     CBC today         Relevant Orders    CBC with Auto Differential        Caridad Larkin MD     11:25 AM    23

## 2023-06-04 ENCOUNTER — HOSPITAL ENCOUNTER (EMERGENCY)
Age: 23
Discharge: STILL ADMITTED OR ADMINISTRATIVE | End: 2023-06-04
Attending: EMERGENCY MEDICINE | Admitting: EMERGENCY MEDICINE
Payer: COMMERCIAL

## 2023-06-04 ENCOUNTER — HOSPITAL ENCOUNTER (OUTPATIENT)
Age: 23
Discharge: HOME | End: 2023-06-04
Attending: OBSTETRICS & GYNECOLOGY | Admitting: OBSTETRICS & GYNECOLOGY
Payer: COMMERCIAL

## 2023-06-04 VITALS
HEART RATE: 82 BPM | HEIGHT: 64 IN | TEMPERATURE: 98 F | BODY MASS INDEX: 26.46 KG/M2 | DIASTOLIC BLOOD PRESSURE: 69 MMHG | RESPIRATION RATE: 18 BRPM | SYSTOLIC BLOOD PRESSURE: 104 MMHG | WEIGHT: 155 LBS | OXYGEN SATURATION: 98 %

## 2023-06-04 VITALS
TEMPERATURE: 98.3 F | OXYGEN SATURATION: 100 % | RESPIRATION RATE: 20 BRPM | DIASTOLIC BLOOD PRESSURE: 71 MMHG | HEART RATE: 90 BPM | WEIGHT: 155 LBS | SYSTOLIC BLOOD PRESSURE: 122 MMHG | HEIGHT: 64 IN | BODY MASS INDEX: 26.46 KG/M2

## 2023-06-04 LAB
A VAGINAE DNA VAG QL NAA+PROBE: ABNORMAL SCORE
APPEARANCE UR: ABNORMAL
BACTERIA SPEC CULT: NORMAL
BACTERIA URNS QL MICRO: 0 /HPF
BILIRUB UR QL: NEGATIVE
BVAB2 DNA VAG QL NAA+PROBE: ABNORMAL SCORE
C ALBICANS DNA VAG QL NAA+PROBE: POSITIVE
C GLABRATA DNA VAG QL NAA+PROBE: NEGATIVE
COLOR UR: ABNORMAL
EPI CELLS #/AREA URNS HPF: ABNORMAL /HPF
GLUCOSE UR STRIP.AUTO-MCNC: NEGATIVE MG/DL
HGB UR QL STRIP: ABNORMAL
KETONES UR QL STRIP.AUTO: NEGATIVE MG/DL
LEUKOCYTE ESTERASE UR QL STRIP.AUTO: ABNORMAL
MEGA1 DNA VAG QL NAA+PROBE: ABNORMAL SCORE
NITRITE UR QL STRIP.AUTO: NEGATIVE
PH UR STRIP: 7 (ref 5–9)
PROT UR STRIP-MCNC: 30 MG/DL
RBC #/AREA URNS HPF: >100 /HPF
SERVICE CMNT-IMP: NORMAL
SP GR UR REFRACTOMETRY: 1.01 (ref 1–1.02)
SPECIMEN SOURCE: ABNORMAL
UROBILINOGEN UR QL STRIP.AUTO: 0.2 EU/DL (ref 0.2–1)
WBC URNS QL MICRO: ABNORMAL /HPF

## 2023-06-04 PROCEDURE — 6360000002 HC RX W HCPCS: Performed by: OBSTETRICS & GYNECOLOGY

## 2023-06-04 PROCEDURE — 2580000003 HC RX 258: Performed by: OBSTETRICS & GYNECOLOGY

## 2023-06-04 PROCEDURE — 81001 URINALYSIS AUTO W/SCOPE: CPT

## 2023-06-04 PROCEDURE — 99283 EMERGENCY DEPT VISIT LOW MDM: CPT

## 2023-06-04 RX ORDER — SODIUM CHLORIDE, SODIUM LACTATE, POTASSIUM CHLORIDE, CALCIUM CHLORIDE 600; 310; 30; 20 MG/100ML; MG/100ML; MG/100ML; MG/100ML
INJECTION, SOLUTION INTRAVENOUS CONTINUOUS
Status: ACTIVE | OUTPATIENT
Start: 2023-06-04 | End: 2023-06-04

## 2023-06-04 RX ADMIN — CEFTRIAXONE 1000 MG: 1 INJECTION, POWDER, FOR SOLUTION INTRAMUSCULAR; INTRAVENOUS at 02:52

## 2023-06-04 RX ADMIN — SODIUM CHLORIDE, POTASSIUM CHLORIDE, SODIUM LACTATE AND CALCIUM CHLORIDE: 600; 310; 30; 20 INJECTION, SOLUTION INTRAVENOUS at 02:52

## 2023-06-04 ASSESSMENT — PAIN DESCRIPTION - LOCATION: LOCATION: BACK

## 2023-06-04 ASSESSMENT — PAIN SCALES - GENERAL: PAINLEVEL_OUTOF10: 3

## 2023-06-04 ASSESSMENT — PAIN - FUNCTIONAL ASSESSMENT: PAIN_FUNCTIONAL_ASSESSMENT: 0-10

## 2023-06-04 ASSESSMENT — LIFESTYLE VARIABLES
HOW MANY STANDARD DRINKS CONTAINING ALCOHOL DO YOU HAVE ON A TYPICAL DAY: PATIENT DOES NOT DRINK
HOW OFTEN DO YOU HAVE A DRINK CONTAINING ALCOHOL: NEVER

## 2023-06-05 LAB
A VAGINAE DNA VAG QL NAA+PROBE: ABNORMAL SCORE
BVAB2 DNA VAG QL NAA+PROBE: ABNORMAL SCORE
C ALBICANS DNA VAG QL NAA+PROBE: POSITIVE
C GLABRATA DNA VAG QL NAA+PROBE: NEGATIVE
C TRACH RRNA SPEC QL NAA+PROBE: NEGATIVE
MEGA1 DNA VAG QL NAA+PROBE: ABNORMAL SCORE
N GONORRHOEA RRNA SPEC QL NAA+PROBE: NEGATIVE
SPECIMEN SOURCE: ABNORMAL
T VAGINALIS RRNA SPEC QL NAA+PROBE: NEGATIVE

## 2023-06-12 DIAGNOSIS — Z34.83 MULTIGRAVIDA IN THIRD TRIMESTER: ICD-10-CM

## 2023-06-12 DIAGNOSIS — O23.43 URINARY TRACT INFECTION IN MOTHER DURING THIRD TRIMESTER OF PREGNANCY: ICD-10-CM

## 2023-06-15 ENCOUNTER — HOSPITAL ENCOUNTER (INPATIENT)
Age: 23
LOS: 1 days | Discharge: HOME | DRG: 560 | End: 2023-06-16
Attending: OBSTETRICS & GYNECOLOGY | Admitting: OBSTETRICS & GYNECOLOGY
Payer: COMMERCIAL

## 2023-06-15 PROBLEM — Z37.9 NORMAL LABOR: Status: ACTIVE | Noted: 2023-06-15

## 2023-06-15 LAB
ABO + RH BLD: NORMAL
AMNISURE, POC: POSITIVE
BACTERIA SPEC CULT: NORMAL
BLOOD GROUP ANTIBODIES SERPL: NORMAL
ERYTHROCYTE [DISTWIDTH] IN BLOOD BY AUTOMATED COUNT: 13.1 % (ref 11.9–14.6)
HCT VFR BLD AUTO: 33.7 % (ref 35.8–46.3)
HGB BLD-MCNC: 11.7 G/DL (ref 11.7–15.4)
Lab: NORMAL
MCH RBC QN AUTO: 30.6 PG (ref 26.1–32.9)
MCHC RBC AUTO-ENTMCNC: 34.7 G/DL (ref 31.4–35)
MCV RBC AUTO: 88.2 FL (ref 82–102)
NEGATIVE QC PASS/FAIL: NORMAL
NRBC # BLD: 0 K/UL (ref 0–0.2)
PLATELET # BLD AUTO: 224 K/UL (ref 150–450)
PMV BLD AUTO: 10.4 FL (ref 9.4–12.3)
POSITIVE QC PASS/FAIL: NORMAL
RBC # BLD AUTO: 3.82 M/UL (ref 4.05–5.2)
SERVICE CMNT-IMP: NORMAL
SPECIMEN EXP DATE BLD: NORMAL
WBC # BLD AUTO: 8.8 K/UL (ref 4.3–11.1)

## 2023-06-15 PROCEDURE — 7100000010 HC PHASE II RECOVERY - FIRST 15 MIN

## 2023-06-15 PROCEDURE — 86900 BLOOD TYPING SEROLOGIC ABO: CPT

## 2023-06-15 PROCEDURE — 4500000002 HC ER NO CHARGE

## 2023-06-15 PROCEDURE — 99282 EMERGENCY DEPT VISIT SF MDM: CPT

## 2023-06-15 PROCEDURE — 00HU33Z INSERTION OF INFUSION DEVICE INTO SPINAL CANAL, PERCUTANEOUS APPROACH: ICD-10-PCS | Performed by: ANESTHESIOLOGY

## 2023-06-15 PROCEDURE — 1100000000 HC RM PRIVATE

## 2023-06-15 PROCEDURE — 86901 BLOOD TYPING SEROLOGIC RH(D): CPT

## 2023-06-15 PROCEDURE — 86850 RBC ANTIBODY SCREEN: CPT

## 2023-06-15 PROCEDURE — 7210000100 HC LABOR FEE PER 1 HR

## 2023-06-15 PROCEDURE — 7220000101 HC DELIVERY VAGINAL/SINGLE

## 2023-06-15 PROCEDURE — 6370000000 HC RX 637 (ALT 250 FOR IP): Performed by: OBSTETRICS & GYNECOLOGY

## 2023-06-15 PROCEDURE — 85027 COMPLETE CBC AUTOMATED: CPT

## 2023-06-15 PROCEDURE — 7100000011 HC PHASE II RECOVERY - ADDTL 15 MIN

## 2023-06-15 PROCEDURE — 59409 OBSTETRICAL CARE: CPT | Performed by: OBSTETRICS & GYNECOLOGY

## 2023-06-15 RX ORDER — LANOLIN
CREAM (ML) TOPICAL PRN
Status: DISCONTINUED | OUTPATIENT
Start: 2023-06-15 | End: 2023-06-16 | Stop reason: HOSPADM

## 2023-06-15 RX ORDER — ONDANSETRON 2 MG/ML
4 INJECTION INTRAMUSCULAR; INTRAVENOUS EVERY 6 HOURS PRN
Status: DISCONTINUED | OUTPATIENT
Start: 2023-06-15 | End: 2023-06-16 | Stop reason: HOSPADM

## 2023-06-15 RX ORDER — SODIUM CHLORIDE 0.9 % (FLUSH) 0.9 %
5-40 SYRINGE (ML) INJECTION EVERY 12 HOURS SCHEDULED
Status: DISCONTINUED | OUTPATIENT
Start: 2023-06-15 | End: 2023-06-15 | Stop reason: SDUPTHER

## 2023-06-15 RX ORDER — FENTANYL CITRATE 50 UG/ML
INJECTION, SOLUTION INTRAMUSCULAR; INTRAVENOUS
Status: COMPLETED
Start: 2023-06-15 | End: 2023-06-15

## 2023-06-15 RX ORDER — DOCUSATE SODIUM 100 MG/1
100 CAPSULE, LIQUID FILLED ORAL 2 TIMES DAILY
Status: DISCONTINUED | OUTPATIENT
Start: 2023-06-15 | End: 2023-06-15 | Stop reason: SDUPTHER

## 2023-06-15 RX ORDER — ACETAMINOPHEN 325 MG/1
650 TABLET ORAL EVERY 4 HOURS PRN
Status: DISCONTINUED | OUTPATIENT
Start: 2023-06-15 | End: 2023-06-15 | Stop reason: SDUPTHER

## 2023-06-15 RX ORDER — SODIUM CHLORIDE, SODIUM LACTATE, POTASSIUM CHLORIDE, CALCIUM CHLORIDE 600; 310; 30; 20 MG/100ML; MG/100ML; MG/100ML; MG/100ML
INJECTION, SOLUTION INTRAVENOUS CONTINUOUS
Status: DISCONTINUED | OUTPATIENT
Start: 2023-06-15 | End: 2023-06-15 | Stop reason: SDUPTHER

## 2023-06-15 RX ORDER — SODIUM CHLORIDE 0.9 % (FLUSH) 0.9 %
5-40 SYRINGE (ML) INJECTION EVERY 12 HOURS SCHEDULED
Status: DISCONTINUED | OUTPATIENT
Start: 2023-06-15 | End: 2023-06-16 | Stop reason: HOSPADM

## 2023-06-15 RX ORDER — SODIUM CHLORIDE, SODIUM LACTATE, POTASSIUM CHLORIDE, AND CALCIUM CHLORIDE .6; .31; .03; .02 G/100ML; G/100ML; G/100ML; G/100ML
1000 INJECTION, SOLUTION INTRAVENOUS PRN
Status: DISCONTINUED | OUTPATIENT
Start: 2023-06-15 | End: 2023-06-16 | Stop reason: HOSPADM

## 2023-06-15 RX ORDER — ACETAMINOPHEN 500 MG
1000 TABLET ORAL EVERY 8 HOURS PRN
Status: DISCONTINUED | OUTPATIENT
Start: 2023-06-15 | End: 2023-06-16 | Stop reason: HOSPADM

## 2023-06-15 RX ORDER — MISOPROSTOL 200 UG/1
200 TABLET ORAL PRN
Status: DISCONTINUED | OUTPATIENT
Start: 2023-06-15 | End: 2023-06-16 | Stop reason: HOSPADM

## 2023-06-15 RX ORDER — IBUPROFEN 800 MG/1
800 TABLET ORAL EVERY 8 HOURS
Status: DISCONTINUED | OUTPATIENT
Start: 2023-06-15 | End: 2023-06-16 | Stop reason: HOSPADM

## 2023-06-15 RX ORDER — DOCUSATE SODIUM 100 MG/1
100 CAPSULE, LIQUID FILLED ORAL 2 TIMES DAILY
Status: DISCONTINUED | OUTPATIENT
Start: 2023-06-15 | End: 2023-06-16 | Stop reason: HOSPADM

## 2023-06-15 RX ORDER — SODIUM CHLORIDE 0.9 % (FLUSH) 0.9 %
5-40 SYRINGE (ML) INJECTION PRN
Status: DISCONTINUED | OUTPATIENT
Start: 2023-06-15 | End: 2023-06-15 | Stop reason: SDUPTHER

## 2023-06-15 RX ORDER — SODIUM CHLORIDE 9 MG/ML
25 INJECTION, SOLUTION INTRAVENOUS PRN
Status: DISCONTINUED | OUTPATIENT
Start: 2023-06-15 | End: 2023-06-16 | Stop reason: HOSPADM

## 2023-06-15 RX ORDER — METHYLERGONOVINE MALEATE 0.2 MG/ML
200 INJECTION INTRAVENOUS PRN
Status: DISCONTINUED | OUTPATIENT
Start: 2023-06-15 | End: 2023-06-15 | Stop reason: SDUPTHER

## 2023-06-15 RX ORDER — SODIUM CHLORIDE, SODIUM LACTATE, POTASSIUM CHLORIDE, CALCIUM CHLORIDE 600; 310; 30; 20 MG/100ML; MG/100ML; MG/100ML; MG/100ML
INJECTION, SOLUTION INTRAVENOUS CONTINUOUS
Status: DISCONTINUED | OUTPATIENT
Start: 2023-06-15 | End: 2023-06-16 | Stop reason: HOSPADM

## 2023-06-15 RX ORDER — SODIUM CHLORIDE 9 MG/ML
INJECTION, SOLUTION INTRAVENOUS PRN
Status: DISCONTINUED | OUTPATIENT
Start: 2023-06-15 | End: 2023-06-16 | Stop reason: HOSPADM

## 2023-06-15 RX ORDER — MISOPROSTOL 200 UG/1
800 TABLET ORAL PRN
Status: DISCONTINUED | OUTPATIENT
Start: 2023-06-15 | End: 2023-06-15 | Stop reason: SDUPTHER

## 2023-06-15 RX ORDER — SODIUM CHLORIDE, SODIUM LACTATE, POTASSIUM CHLORIDE, AND CALCIUM CHLORIDE .6; .31; .03; .02 G/100ML; G/100ML; G/100ML; G/100ML
500 INJECTION, SOLUTION INTRAVENOUS PRN
Status: DISCONTINUED | OUTPATIENT
Start: 2023-06-15 | End: 2023-06-16 | Stop reason: HOSPADM

## 2023-06-15 RX ORDER — TRANEXAMIC ACID 10 MG/ML
1000 INJECTION, SOLUTION INTRAVENOUS
Status: ACTIVE | OUTPATIENT
Start: 2023-06-15 | End: 2023-06-16

## 2023-06-15 RX ORDER — METHYLERGONOVINE MALEATE 0.2 MG/ML
200 INJECTION INTRAVENOUS PRN
Status: DISCONTINUED | OUTPATIENT
Start: 2023-06-15 | End: 2023-06-16 | Stop reason: HOSPADM

## 2023-06-15 RX ORDER — CARBOPROST TROMETHAMINE 250 UG/ML
250 INJECTION, SOLUTION INTRAMUSCULAR PRN
Status: DISCONTINUED | OUTPATIENT
Start: 2023-06-15 | End: 2023-06-16 | Stop reason: HOSPADM

## 2023-06-15 RX ORDER — SODIUM CHLORIDE 0.9 % (FLUSH) 0.9 %
5-40 SYRINGE (ML) INJECTION PRN
Status: DISCONTINUED | OUTPATIENT
Start: 2023-06-15 | End: 2023-06-16 | Stop reason: HOSPADM

## 2023-06-15 RX ORDER — OXYCODONE HYDROCHLORIDE 5 MG/1
5 TABLET ORAL EVERY 4 HOURS PRN
Status: DISCONTINUED | OUTPATIENT
Start: 2023-06-15 | End: 2023-06-16 | Stop reason: HOSPADM

## 2023-06-15 RX ADMIN — WITCH HAZEL: 500 SOLUTION RECTAL; TOPICAL at 07:50

## 2023-06-15 RX ADMIN — IBUPROFEN 800 MG: 800 TABLET, FILM COATED ORAL at 07:50

## 2023-06-15 RX ADMIN — Medication 166.7 ML: at 05:15

## 2023-06-15 RX ADMIN — DOCUSATE SODIUM 100 MG: 100 CAPSULE, LIQUID FILLED ORAL at 20:47

## 2023-06-15 RX ADMIN — OXYCODONE HYDROCHLORIDE 5 MG: 5 TABLET ORAL at 20:47

## 2023-06-15 RX ADMIN — IBUPROFEN 800 MG: 800 TABLET, FILM COATED ORAL at 23:03

## 2023-06-15 RX ADMIN — DOCUSATE SODIUM 100 MG: 100 CAPSULE, LIQUID FILLED ORAL at 07:50

## 2023-06-15 RX ADMIN — Medication: at 07:50

## 2023-06-15 RX ADMIN — IBUPROFEN 800 MG: 800 TABLET, FILM COATED ORAL at 15:11

## 2023-06-15 ASSESSMENT — PAIN DESCRIPTION - DESCRIPTORS: DESCRIPTORS: CRAMPING;SORE;SHARP

## 2023-06-15 ASSESSMENT — PAIN SCALES - GENERAL: PAINLEVEL_OUTOF10: 6

## 2023-06-15 ASSESSMENT — PAIN DESCRIPTION - ORIENTATION: ORIENTATION: ANTERIOR;LOWER;POSTERIOR

## 2023-06-15 ASSESSMENT — PAIN DESCRIPTION - LOCATION: LOCATION: ABDOMEN;BACK

## 2023-06-15 NOTE — LACTATION NOTE

## 2023-06-15 NOTE — PLAN OF CARE
TRANSFER - IN REPORT:    Verbal report received from Radhames Patrick RN on [employer]  being received from L&D for routine progression of patient care      Report consisted of patient's Situation, Background, Assessment and   Recommendations(SBAR). Information from the following report(s) Nurse Handoff Report was reviewed with the receiving nurse. Opportunity for questions and clarification was provided. Assessment completed upon patient's arrival to unit and care assumed.

## 2023-06-15 NOTE — LACTATION NOTE
This note was copied from a baby's chart. In to see mom and infant for the first time. Mom stated that infant latched and nursed well once since birth. Reviewed the expectation of the first 24 hours as well as the second night. Lactation consultant will follow up tomorrow.

## 2023-06-15 NOTE — PROGRESS NOTES
Delivery Note    Dr Mcclure  arrived to bedside at [address] [place] [address]. Pt positioned for delivery and set up at 0500. Spontaneous vaginal delivery of viable male infant @ 18. Apgar's 8/9. Perineum with no repairs. See delivery summary for details.

## 2023-06-15 NOTE — L&D DELIVERY NOTE
Mine Mccloud [[id number]]      Labor Events     Labor: No   Steroids: None  Cervical Ripening Date/Time:      Antibiotics Received during Labor: No  Rupture Date/Time:  6/15/23 02:30:00   Rupture Type: SROM, Intact  Fluid Color: Clear  Fluid Odor: None  Fluid Volume:  Moderate  Labor Complications: None              Anesthesia    Method: Epidural       Labor Event Times      Labor onset date/time:        Dilation complete date/time:  6/15/23 04:56:00 EDT     Start pushing date/time:  6/15/2023 05:03:47   Decision date/time (emergent ):            Delivery Details      Delivery Date: 6/15/23 Delivery Time: 05:08:00   Delivery Type: Vaginal, Spontaneous               Presentation    Presentation: Vertex  Position: Left  _: Occiput  _: Anterior       Shoulder Dystocia    Shoulder Dystocia Present?: No       Assisted Delivery Details    Forceps Attempted?: No  Vacuum Extractor Attempted?: No                           Cord    Vessels: 3 Vessels  Complications: None  Delayed Cord Clamping?: Yes  Cord Clamped Date/Time: 6/15/2023 05:09:00  Cord Blood Disposition: Lab  Gases Sent?: No              Placenta    Date/Time: 6/15/2023 05:11:00  Removal: Spontaneous  Disposition: Discarded       Lacerations           Vaginal Counts    Initial Count Personnel: TB  Initial Count Verified By: Boo Will  Intial Sponge Count: Correct Intial Needles Count: Correct Intial Instruments Count: Correct   Final Sponges Count: Correct Final Needles  Count: Correct Final Instruments Count: Correct   Final Count Personnel: TB  Final Count Verified By: Boo Will  Accurate Final Count?: Yes       Blood Loss  Mother: Harjeet Valladares #[id number]     Start of Mother's Information      Delivery Blood Loss  23 1708 - 06/15/23 0516      Quantitative Blood Loss (mL) Hospital Encounter 100 grams    Total  100 mL               End of Mother's Information  Mother: Harjeet Valladares #[id number]

## 2023-06-15 NOTE — L&D DELIVERY NOTE
[place] [id number] [facility], [address], [address] Chad Wright Junior, MD, Hedy Larkin, JADA-BC  Angie De Jesus MD, FACOG  Delivery Note    Present for entire delivery. Details in delivery summary. . Viable male infant, APGARS 8,9 .   Weight - pending, skin to skin    EBL:  200 mL  Pain mgmt:   epidural    Placenta spont, intact, 3VC. No lacerations    Pt and infant stable.       Jessica Briones MD   5:15 AM  06/15/23

## 2023-06-15 NOTE — H&P
History & Physical    Name: Cj Francisco MRN: [id number]  SSN: [id number]    YOB: 2000  Age: 25 y.o. Sex: female      Subjective:     Reason for Triage visit:  37w6d and contractions, LOF    History of Present Illness: Ms. Nely Kelsey is a 25 y.o. Jessy Heckler with an estimated gestational age of 41w10d with Estimated Date of Delivery: 23. Patient reports ctx for several hours and gush of fluid at 230am. No VB. Good FM. Pregnancy has been complicated by:  H/o shoulder dystocia,     Patient denies chest pain, fever, headache , nausea and vomiting, right upper quadrant pain  , shortness of breath, swelling, vaginal bleeding , and visual disturbances.     OB History    Para Term  AB Living   2 1 1 0 0 1   SAB IAB Ectopic Molar Multiple Live Births             1      # Outcome Date GA Lbr [place]/2nd Weight Sex Delivery Anes PTL Lv   2 Current            1 Term 20 37w2d 22:51 / 05:06 6 lb 13.7 oz (3.11 kg) M Vag-Vacuum EPI N RYLAN      Complications: Shoulder Dystocia     Past Medical History:   Diagnosis Date    Anemia     Asthma     EIA, as child    Complication of anesthesia     irregular heart rhythm    Gestational hypertension     Intractable migraine with aura without status migrainosus 2022    Irregular heart beat     during surgery on knee    Migraine      Past Surgical History:   Procedure Laterality Date    ORTHOPEDIC SURGERY Left     ACL reconstruction    TONSILLECTOMY       Social History     Occupational History    Not on file   Tobacco Use    Smoking status: Never    Smokeless tobacco: Never   Substance and Sexual Activity    Alcohol use: Not Currently     Comment: occ    Drug use: Never    Sexual activity: Yes     Partners: Male      Family History   Problem Relation Age of Onset    Seizures Maternal Grandmother     Hypertension Maternal Grandmother     Diabetes Maternal Aunt     Hypertension Maternal Aunt     Uterine Cancer Neg Hx     Ovarian Cancer Neg Hx

## 2023-06-16 VITALS
RESPIRATION RATE: 18 BRPM | BODY MASS INDEX: 26.63 KG/M2 | OXYGEN SATURATION: 100 % | HEART RATE: 82 BPM | TEMPERATURE: 97.9 F | SYSTOLIC BLOOD PRESSURE: 112 MMHG | DIASTOLIC BLOOD PRESSURE: 67 MMHG | HEIGHT: 64 IN | WEIGHT: 156 LBS

## 2023-06-16 PROCEDURE — 6370000000 HC RX 637 (ALT 250 FOR IP): Performed by: OBSTETRICS & GYNECOLOGY

## 2023-06-16 RX ORDER — IBUPROFEN 800 MG/1
800 TABLET ORAL EVERY 8 HOURS
Qty: 120 TABLET | Refills: 3 | Status: SHIPPED | OUTPATIENT
Start: 2023-06-16

## 2023-06-16 RX ADMIN — IBUPROFEN 800 MG: 800 TABLET, FILM COATED ORAL at 09:38

## 2023-06-16 RX ADMIN — DOCUSATE SODIUM 100 MG: 100 CAPSULE, LIQUID FILLED ORAL at 09:38

## 2023-06-16 NOTE — DISCHARGE SUMMARY
Discharge Summary     Date of Admission:  6/15/2023  3:42 AM  Date of Discharge:  23    Crescencio Betancur 25 y.o. [id number] presented at 37w6d for induction/in active labor. Pt had  without incident. See delivery note for all delivery details. Pt's PP course was uneventful. On day of D/C, she was ambulating well, afebrile, with lochia < menses. She was discharged home with medications as below. Pt was breast feeding on discharge. She plans on undecided for birth control. HTN diagnosis: no   Routine PP instructions given to patient. She is to follow up with [facility] in 6 weeks for PP exam.       Medication List        START taking these medications      ibuprofen 800 MG tablet  Commonly known as: ADVIL;MOTRIN  Take 1 tablet by mouth in the morning and 1 tablet at noon and 1 tablet in the evening. witch hazel-glycerin pad  Commonly known as: TUCKS  Place rectally as needed.             CONTINUE taking these medications      docusate sodium 100 MG capsule  Commonly known as: COLACE     PRENATAL VITAMIN PO            STOP taking these medications      ferrous sulfate 325 (65 Fe) MG tablet  Commonly known as: IRON 325               Where to Get Your Medications        These medications were sent to [facility]/pharmacy #[id number] [address], [address] [place] [address]      Phone: [phone]   ibuprofen 800 MG tablet  witch hazel-glycerin pad         Rama Mayfield DO  10:09 AM  23

## 2023-06-16 NOTE — LACTATION NOTE
This note was copied from a baby's chart. In to see mom and infant prior to discharge to home. Experienced mom stated that infant has continued to latch and nurse well. Reviewed discharge information and answered questions. Mom stated that she feels confident and has no concerns at this time. Mom to follow up with lactation consultant as needed.

## 2023-06-16 NOTE — PLAN OF CARE
Problem: Pain  Goal: Verbalizes/displays adequate comfort level or baseline comfort level  6/15/2023 2111 by Brad Bates RN  Outcome: Progressing  6/15/2023 0715 by Britt Macias RN  Outcome: Progressing     Problem: Postpartum  Goal: Experiences normal postpartum course  Description:  Postpartum OB-Pregnancy care plan goal which identifies if the mother is experiencing a normal postpartum course  6/15/2023 2111 by Brad Bates RN  Outcome: Progressing  6/15/2023 0715 by Britt Macias RN  Outcome: Progressing  Goal: Appropriate maternal -  bonding  Description:  Postpartum OB-Pregnancy care plan goal which identifies if the mother and  are bonding appropriately  6/15/2023 2111 by Brad Bates RN  Outcome: Progressing  6/15/2023 0715 by Britt Macias RN  Outcome: Progressing  Goal: Establishment of infant feeding pattern  Description:  Postpartum OB-Pregnancy care plan goal which identifies if the mother is establishing a feeding pattern with their   6/15/2023 2111 by Brad Bates RN  Outcome: Progressing  6/15/2023 0715 by Britt Macias RN  Outcome: Progressing  Goal: Incisions, wounds, or drain sites healing without S/S of infection  6/15/2023 0715 by Britt Macias RN  Outcome: Progressing     Problem: Infection - Adult  Goal: Absence of infection at discharge  6/15/2023 0715 by Britt Macias RN  Outcome: Progressing  Goal: Absence of infection during hospitalization  6/15/2023 2111 by Brad Bates RN  Outcome: Progressing  6/15/2023 0715 by Britt Macias RN  Outcome: Progressing  Goal: Absence of fever/infection during anticipated neutropenic period  6/15/2023 0715 by Britt Macias RN  Outcome: Progressing     Problem: Safety - Adult  Goal: Free from fall injury  6/15/2023 2111 by Brad Bates RN  Outcome: Progressing  6/15/2023 0715 by Britt Macias RN  Outcome: Progressing

## 2023-06-16 NOTE — CARE COORDINATION
Chart reviewed - no needs identified. SW met with patient to complete initial assessment. Patient denies any history of postpartum or generalized depression/anxiety. Patient given informational packet on  mood & anxiety disorders (resources/education). Family denies any additional needs from  at this time. Family has 's contact information should any needs/questions arise.     LOLITA Vallecillo, [address] [facility] [place]   [phone]

## 2023-06-16 NOTE — LACTATION NOTE

## 2023-06-16 NOTE — DISCHARGE INSTRUCTIONS
Vaginal Childbirth: Care Instructions  Overview     Vaginal birth means delivering a baby through the birth canal (vagina). During labor, the uterus tightens (contracts) regularly to thin and open the cervix and to push the baby out through the birth canal.  Your body will slowly heal in the next few weeks. It's easy to get too tired and overwhelmed during the first weeks after your baby is born. Changes in your hormones can shift your mood without warning. You may find it hard to meet the extra demands on your energy and time. Take it easy on yourself. Follow-up care is a key part of your treatment and safety. Be sure to make and go to all appointments, and call your doctor if you are having problems. It's also a good idea to know your test results and keep a list of the medicines you take. How can you care for yourself at home? Vaginal bleeding and cramps  After delivery, you will have a bloody discharge from your vagina. This will turn pink within a week and then white or yellow after about 10 days. It may last for 2 to 4 weeks or longer, until the uterus has healed. Use sanitary pads until you stop bleeding. Using pads makes it easier to monitor your bleeding. Don't worry if you pass some blood clots, as long as they are smaller than a golf ball. If you have a tear or stitches in your vaginal area, change the pad at least every 4 hours. This will help prevent soreness and infection. You may have cramps for the first few days after childbirth. These are normal and occur as the uterus shrinks to normal size. Take an over-the-counter pain medicine, such as acetaminophen (Tylenol), ibuprofen (Advil, Motrin), or naproxen (Aleve), for cramps. Read and follow all instructions on the label. Do not take aspirin, because it can cause more bleeding. Do not take two or more pain medicines at the same time unless the doctor told you to. Many pain medicines have acetaminophen, which is Tylenol.  Too much acetaminophen

## 2023-07-25 ENCOUNTER — POSTPARTUM VISIT (OUTPATIENT)
Dept: OBGYN CLINIC | Age: 23
End: 2023-07-25
Payer: COMMERCIAL

## 2023-07-25 VITALS
BODY MASS INDEX: 26.12 KG/M2 | HEIGHT: 64 IN | DIASTOLIC BLOOD PRESSURE: 68 MMHG | SYSTOLIC BLOOD PRESSURE: 114 MMHG | WEIGHT: 153 LBS

## 2023-07-25 PROBLEM — O99.013 ANEMIA DURING PREGNANCY IN THIRD TRIMESTER: Status: RESOLVED | Noted: 2023-04-11 | Resolved: 2023-07-25

## 2023-07-25 PROBLEM — Z34.83 MULTIGRAVIDA IN THIRD TRIMESTER: Status: RESOLVED | Noted: 2022-11-28 | Resolved: 2023-07-25

## 2023-07-25 PROBLEM — Z37.9 NORMAL LABOR: Status: RESOLVED | Noted: 2023-06-15 | Resolved: 2023-07-25

## 2023-07-25 RX ORDER — ACETAMINOPHEN AND CODEINE PHOSPHATE 120; 12 MG/5ML; MG/5ML
1 SOLUTION ORAL DAILY
Qty: 28 TABLET | Refills: 12 | Status: SHIPPED | OUTPATIENT
Start: 2023-07-25

## 2023-07-25 ASSESSMENT — PATIENT HEALTH QUESTIONNAIRE - PHQ9
SUM OF ALL RESPONSES TO PHQ QUESTIONS 1-9: 0
SUM OF ALL RESPONSES TO PHQ QUESTIONS 1-9: 0
1. LITTLE INTEREST OR PLEASURE IN DOING THINGS: 0
SUM OF ALL RESPONSES TO PHQ QUESTIONS 1-9: 0
SUM OF ALL RESPONSES TO PHQ QUESTIONS 1-9: 0
2. FEELING DOWN, DEPRESSED OR HOPELESS: 0
SUM OF ALL RESPONSES TO PHQ9 QUESTIONS 1 & 2: 0

## 2023-07-25 NOTE — PROGRESS NOTES
Patient comes in today for 6 week post partum visit. Patient would like to discuss [facility] options.      Delivery Method: Vaginal     Delivery Date: 6/15/2023     Birth Control: none    Breast/Bottle: breastfeeding     Bleeding: scant     Baby: Doing well    Bowel/Bladder: No issues    Blues: None    Last pap smear:  6/29/2021, neg.,     Vitals:    07/25/23 1005   BP: 114/68        Fernie Douglas RN  10:12 AM  07/25/23
at home, no history of physical abuse, no history of sexual abuse       Social Determinants of Health     Financial Resource Strain: Low Risk     Difficulty of Paying Living Expenses: Not hard at all   Food Insecurity: No Food Insecurity    Worried About [place] in the Last Year: Never true    [address] in the Last Year: Never true   Transportation Needs: Unknown    Lack of Transportation (Medical): Not on file    Lack of Transportation (Non-Medical):  No   Physical Activity: Not on file   Stress: Not on file   Social Connections: Not on file   Intimate Partner Violence: Not on file   Housing Stability: Unknown    Unable to Pay for Housing in the Last Year: Not on file    Number of Places Lived in the Last Year: Not on file    Unstable Housing in the Last Year: No       Vitals:    07/25/23 1005   BP: 114/68   Site: Left Upper Arm   Position: Sitting   Weight: 153 lb (69.4 kg)   Height: 5' 4\" (1.626 m)        Review of Systems    Constitutional:  No fevers or chills    CV: No chest pain or palpitations    Resp: No SOB or cough    GI:  No nausea/vomiting/diarrhea/constipation    Neuro: No HA, no seizure like activity    Skin: No rashes or lesions    Breast: No breast pain, masses, bleeding    : No dysuria or hematuria    Gyn:  No menstrual, pelvic issues      Physical Exam    General:  well developed, well nourished, in no acute distress     Head:   normocephalic and atraumatic     Mouth:  no deformity or lesions with good dentition     Neck:  no masses, thyromegaly, or abnormal cervical nodes     Lungs:  clear bilaterally with normal respirations     Heart:   regular rate and rhythm, S1, S2 without murmurs     Abdomen:  bowel sounds positive; abdomen soft and non-tender without masses, organomegaly, or hernias noted     Pelvic Exam:       External: normal female genitalia without lesions or masses       Vagina: normal without lesions or masses       Cervix: normal without lesions or masses

## 2023-07-25 NOTE — ASSESSMENT & PLAN NOTE
Exam as above.   POPs until Nexplanon available  Encouraged annual exams with paps as indicated  Pt to F/U with PCP for all non-gyn health related issues

## 2023-07-25 NOTE — PATIENT INSTRUCTIONS
You can start your birth control pills today. You should try to take them around the same time each day. Remember the pills may cause irregular bleeding for the first couple of months when starting pills. We will call you as soon as we get  a Nexplanon and have you come in for insertion  Thanks for coming to see us today and letting us take care of you!

## 2023-07-29 LAB
C TRACH RRNA CVX QL NAA+PROBE: NEGATIVE
CYTOLOGIST CVX/VAG CYTO: NORMAL
CYTOLOGY CVX/VAG DOC THIN PREP: NORMAL
HPV REFLEX: NORMAL
Lab: NORMAL
N GONORRHOEA RRNA CVX QL NAA+PROBE: NEGATIVE
PATH REPORT.FINAL DX SPEC: NORMAL
STAT OF ADQ CVX/VAG CYTO-IMP: NORMAL
T VAGINALIS RRNA SPEC QL NAA+PROBE: NEGATIVE

## 2023-09-28 PROBLEM — G43.109 MIGRAINE WITH AURA AND WITHOUT STATUS MIGRAINOSUS, NOT INTRACTABLE: Status: ACTIVE | Noted: 2022-08-26

## 2023-09-28 PROBLEM — Z00.00 ANNUAL PHYSICAL EXAM: Status: ACTIVE | Noted: 2023-09-28

## 2023-10-10 ENCOUNTER — TELEPHONE (OUTPATIENT)
Dept: OBGYN CLINIC | Age: 23
End: 2023-10-10

## 2023-10-10 RX ORDER — MEDROXYPROGESTERONE ACETATE 150 MG/ML
150 INJECTION, SUSPENSION INTRAMUSCULAR ONCE
Qty: 1 ML | Refills: 3 | Status: SHIPPED | OUTPATIENT
Start: 2023-10-10 | End: 2023-10-10

## 2023-10-10 NOTE — TELEPHONE ENCOUNTER
I have sent in Rx for Depo.   She can call with her next cycle and we can get her in for a Depo shot

## 2023-10-10 NOTE — TELEPHONE ENCOUNTER
Dr. Samantah Heller,     Called patient back, patient is requesting depo injection information. Patient stating that it is difficult to keep up with daily pill maintenance. Schedule [facility] consult appointment for patient?

## 2023-10-13 ENCOUNTER — TELEPHONE (OUTPATIENT)
Dept: OBGYN CLINIC | Age: 23
End: 2023-10-13

## 2023-10-13 NOTE — TELEPHONE ENCOUNTER
Called pharmacy to get status of medication due to it reflecting on patients chart as \"discontinued\"    Pharmacy technician Augustina Arellano states she will have to make some calls due to diffculty running the prescription while there is another active RX of norethindrone. Out of pocket for depo- $33.79    Stated to Augustina Arellano that I will call the patient about her decision for depo or if she is planning to stick with the norethindrone POP. Called patient, patient states that for now she will stick with POPs and if she wants to switch over the depo officially she will call the office back.

## 2023-10-28 PROBLEM — Z00.00 ANNUAL PHYSICAL EXAM: Status: RESOLVED | Noted: 2023-09-28 | Resolved: 2023-10-28

## 2024-01-25 ENCOUNTER — TELEPHONE (OUTPATIENT)
Dept: PRIMARY CARE CLINIC | Facility: CLINIC | Age: 24
End: 2024-01-25

## 2024-01-25 ENCOUNTER — OFFICE VISIT (OUTPATIENT)
Dept: OBGYN CLINIC | Age: 24
End: 2024-01-25

## 2024-01-25 VITALS
HEIGHT: 64 IN | WEIGHT: 130 LBS | DIASTOLIC BLOOD PRESSURE: 66 MMHG | BODY MASS INDEX: 22.2 KG/M2 | SYSTOLIC BLOOD PRESSURE: 104 MMHG

## 2024-01-25 DIAGNOSIS — Z30.017 NEXPLANON INSERTION: Primary | ICD-10-CM

## 2024-01-25 RX ORDER — VALACYCLOVIR HYDROCHLORIDE 1 G/1
1000 TABLET, FILM COATED ORAL 2 TIMES DAILY
Qty: 14 TABLET | Refills: 11 | Status: SHIPPED | OUTPATIENT
Start: 2024-01-25

## 2024-01-25 RX ORDER — VALACYCLOVIR HYDROCHLORIDE 1 G/1
1000 TABLET, FILM COATED ORAL 2 TIMES DAILY
COMMUNITY
End: 2024-01-25 | Stop reason: SDUPTHER

## 2024-01-25 NOTE — PROGRESS NOTES
Pt comes in today for Nexplanon Insertion.     NEXPLANON   NDC 63832-788-35  LOT [id number]  EXP 11/2025  UPT     LAST PAP:  07/25/2023 negative     LAST MAMMO:  never     LMP:  Patient's last menstrual period was 01/19/2024 (exact date).    BIRTH CONTROL:  none    TOBACCO USE:  No    FAMILY HISTORY OF:   Breast Cancer:  No   Ovarian Cancer:  No   Uterine Cancer:  No   Colon Cancer:  No    Vitals:    01/25/24 1506   BP: 104/66   Site: Left Upper Arm   Position: Sitting   Weight: 59 kg (130 lb)   Height: 1.626 m (5' 4\")        Joslyn Benedict MA  01/25/24  3:18 PM

## 2024-01-25 NOTE — TELEPHONE ENCOUNTER
Pt needs refill on Valacyclovir HCL 1G tablet    Take 1 tablet by mouth twice a day.    [facility]

## 2024-01-25 NOTE — PROGRESS NOTES
[facility]  [address], [place] [address]  [phone]    Jalen Foster MD, FACOG  Charito Whitman [facility]  Paloma Joseph MD, FACOG    Nexplanon Insertion Note    Pt here for Nexplanon insertion.  No new issues or complaints today.  Patient's last menstrual period was 01/19/2024 (exact date).     Vitals:    01/25/24 1506   BP: 104/66   Site: Left Upper Arm   Position: Sitting   Weight: 59 kg (130 lb)   Height: 1.626 m (5' 4\")        UPT in office today -- negative    Problem List Items Addressed This Visit       Nexplanon insertion - Primary     After informed consent was obtained, area was prepped in sterile fashion.  1% lidocaine was used for local anesthesia.  Nexplanon was inserted in usual fashion without difficulty. Obturator was seen.  Clay was palpated by me and patient. BandAid and coban were placed over site. Post-procedure instructions were discussed with patient.  Patient tolerated procedure well.  Insertion card was given to patient.              Jalen Foster MD  3:38 PM  01/25/24

## 2024-01-25 NOTE — PROGRESS NOTES
Chaperone for Intimate Exam     Chaperone was offer accepted as part of the rooming process    Chaperone: Joslyn PISANO CMA

## 2024-01-25 NOTE — ASSESSMENT & PLAN NOTE
After informed consent was obtained, area was prepped in sterile fashion.  1% lidocaine was used for local anesthesia.  Nexplanon was inserted in usual fashion without difficulty. Obturator was seen.  Clay was palpated by me and patient. BandAid and coban were placed over site. Post-procedure instructions were discussed with patient.  Patient tolerated procedure well.  Insertion card was given to patient.

## 2024-01-25 NOTE — PATIENT INSTRUCTIONS
Keep the wrap on your arm for 24 hours.  You can take the band-aid off in 2-3 days.You may have some pain and bruising.  You can use ice packs and ibuprofen to help with this.  Remember, this takes about 5 days to start working - you should use another form of birth control until then.  Thanks for coming to see us today and letting us take care of you!

## 2024-05-07 ENCOUNTER — TELEPHONE (OUTPATIENT)
Dept: OBGYN CLINIC | Age: 24
End: 2024-05-07

## 2024-05-07 NOTE — TELEPHONE ENCOUNTER
Patient states she is having to pluck her ingrown hair. Patient states she gets irritation in that area like bumps. Patient advised that we can bring her in to have the area examined. Patient was scheduled. Patient voiced understanding. jose antonio

## 2024-05-10 ENCOUNTER — OFFICE VISIT (OUTPATIENT)
Dept: OBGYN CLINIC | Age: 24
End: 2024-05-10

## 2024-05-10 VITALS
SYSTOLIC BLOOD PRESSURE: 102 MMHG | BODY MASS INDEX: 19.81 KG/M2 | WEIGHT: 116 LBS | HEIGHT: 64 IN | DIASTOLIC BLOOD PRESSURE: 64 MMHG

## 2024-05-10 DIAGNOSIS — L73.1 INGROWN HAIR: Primary | ICD-10-CM

## 2024-05-10 DIAGNOSIS — Z11.3 SCREEN FOR STD (SEXUALLY TRANSMITTED DISEASE): ICD-10-CM

## 2024-05-10 SDOH — ECONOMIC STABILITY: INCOME INSECURITY: HOW HARD IS IT FOR YOU TO PAY FOR THE VERY BASICS LIKE FOOD, HOUSING, MEDICAL CARE, AND HEATING?: NOT HARD AT ALL

## 2024-05-10 SDOH — ECONOMIC STABILITY: FOOD INSECURITY: WITHIN THE PAST 12 MONTHS, YOU WORRIED THAT YOUR FOOD WOULD RUN OUT BEFORE YOU GOT MONEY TO BUY MORE.: NEVER TRUE

## 2024-05-10 SDOH — ECONOMIC STABILITY: FOOD INSECURITY: WITHIN THE PAST 12 MONTHS, THE FOOD YOU BOUGHT JUST DIDN'T LAST AND YOU DIDN'T HAVE MONEY TO GET MORE.: NEVER TRUE

## 2024-05-10 ASSESSMENT — PATIENT HEALTH QUESTIONNAIRE - PHQ9
SUM OF ALL RESPONSES TO PHQ QUESTIONS 1-9: 0
SUM OF ALL RESPONSES TO PHQ9 QUESTIONS 1 & 2: 0
1. LITTLE INTEREST OR PLEASURE IN DOING THINGS: NOT AT ALL
SUM OF ALL RESPONSES TO PHQ QUESTIONS 1-9: 0
2. FEELING DOWN, DEPRESSED OR HOPELESS: NOT AT ALL

## 2024-05-10 NOTE — ASSESSMENT & PLAN NOTE
Multiple healed scabbed lesions to mons   Advise pt to STOP plucking hairs/using tweezers to this area  Allow natural hair growth and may use clippers/waxing  May trial vasoline to area as well  Nuswab collected for std screening  Rto prn

## 2024-05-10 NOTE — PROGRESS NOTES
Ame Ahn is a 23 y.o.  who is here to discuss ingrown hairs.   Mostly to top of vaginal area (mons) but did have one on left labia she plucked. States she has a hard time NOT plucking hairs from this area. Stopped waxing awhile ago. Sexually active with 1 partner. Denies vaginal discharge, itching or odor. Uses non scented/perfumed hygiene products      No LMP recorded. Patient has had an implant.            Past Medical History:   Diagnosis Date    Anemia     Asthma     EIA, as child    Complication of anesthesia     irregular heart rhythm    Gestational hypertension     Intractable migraine with aura without status migrainosus 2022    Irregular heart beat     during surgery on knee    Migraine        Past Surgical History:   Procedure Laterality Date    ORTHOPEDIC SURGERY Left     ACL reconstruction    TONSILLECTOMY         Family History   Problem Relation Age of Onset    Seizures Maternal Grandmother     Hypertension Maternal Grandmother     Diabetes Maternal Aunt     Hypertension Maternal Aunt     Uterine Cancer Neg Hx     Ovarian Cancer Neg Hx     Colon Cancer Neg Hx     Breast Cancer Neg Hx        Social History     Socioeconomic History    Marital status: Single     Spouse name: Not on file    Number of children: Not on file    Years of education: Not on file    Highest education level: Not on file   Occupational History    Not on file   Tobacco Use    Smoking status: Never    Smokeless tobacco: Never   Substance and Sexual Activity    Alcohol use: Not Currently     Comment: occ    Drug use: Never    Sexual activity: Yes     Partners: Male     Birth control/protection: None   Other Topics Concern    Not on file   Social History Narrative    Lives with grandmother  Abuse: Feels safe at home, no history of physical abuse, no history of sexual abuse       Social Determinants of Health     Financial Resource Strain: Low Risk  (5/10/2024)    Overall Financial Resource Strain (CARDIA)

## 2024-05-10 NOTE — PROGRESS NOTES
Pt comes in today for ingrown hair. Pt states it is causing discomfort, she has had multiple, located at the top of the vagina. She did have one on the lip and she pulled it out and pus came out.     LAST PAP:  07/25/2023 negative     LAST MAMMO:  never     LMP:  No LMP recorded. Patient has had an implant.    BIRTH CONTROL:  Nexplanon     TOBACCO USE:  No    FAMILY HISTORY OF:   Breast Cancer:  No   Ovarian Cancer:  No   Uterine Cancer:  No   Colon Cancer:  No    Vitals:    05/10/24 1057   BP: 102/64   Site: Left Upper Arm   Position: Sitting   Weight: 52.6 kg (116 lb)   Height: 1.626 m (5' 4\")        Joslyn Benedict MA  05/10/24  11:06 AM

## 2024-05-10 NOTE — PROGRESS NOTES
Chaperone for Intimate Exam     Chaperone was offer accepted as part of the rooming process    Chaperone: Aminta Howard

## 2024-05-10 NOTE — PROGRESS NOTES
I have reviewed the patient's visit today including history, exam and assessment by RILEY Pierce.  I agree with treatment/plan as above.    Jalen Foster MD  3:15 PM  05/10/24

## 2024-05-13 DIAGNOSIS — Z11.3 SCREEN FOR STD (SEXUALLY TRANSMITTED DISEASE): ICD-10-CM

## 2024-07-30 ENCOUNTER — OFFICE VISIT (OUTPATIENT)
Dept: PRIMARY CARE CLINIC | Facility: CLINIC | Age: 24
End: 2024-07-30
Payer: COMMERCIAL

## 2024-07-30 ENCOUNTER — PATIENT MESSAGE (OUTPATIENT)
Dept: PRIMARY CARE CLINIC | Facility: CLINIC | Age: 24
End: 2024-07-30

## 2024-07-30 ENCOUNTER — HOSPITAL ENCOUNTER (OUTPATIENT)
Dept: GENERAL RADIOLOGY | Age: 24
Discharge: HOME | End: 2024-08-02

## 2024-07-30 VITALS
TEMPERATURE: 97.6 F | DIASTOLIC BLOOD PRESSURE: 68 MMHG | HEART RATE: 82 BPM | OXYGEN SATURATION: 98 % | BODY MASS INDEX: 18.78 KG/M2 | SYSTOLIC BLOOD PRESSURE: 104 MMHG | WEIGHT: 110 LBS | RESPIRATION RATE: 16 BRPM | HEIGHT: 64 IN

## 2024-07-30 DIAGNOSIS — S99.911A RIGHT ANKLE INJURY, INITIAL ENCOUNTER: Primary | ICD-10-CM

## 2024-07-30 DIAGNOSIS — S99.911A RIGHT ANKLE INJURY, INITIAL ENCOUNTER: ICD-10-CM

## 2024-07-30 PROCEDURE — 73610 X-RAY EXAM OF ANKLE: CPT

## 2024-07-30 PROCEDURE — 99213 OFFICE O/P EST LOW 20 MIN: CPT | Performed by: FAMILY MEDICINE

## 2024-07-30 RX ORDER — IBUPROFEN 800 MG/1
800 TABLET ORAL EVERY 8 HOURS
Qty: 120 TABLET | Refills: 3 | Status: SHIPPED | OUTPATIENT
Start: 2024-07-30

## 2024-07-30 ASSESSMENT — ENCOUNTER SYMPTOMS
NAUSEA: 0
DIARRHEA: 0
VOMITING: 0
COUGH: 0
SHORTNESS OF BREATH: 0
ABDOMINAL PAIN: 0

## 2024-07-30 NOTE — ASSESSMENT & PLAN NOTE
New issue, rolled her ankle two days ago while sitting for a long period of time at the [facility], limited ankle extension and paresthesias.  Will evaluate with an X-ray, suspect sprain.  Discussed use of an ACE to help with compression, refill sent for 800 mg of Ibuprofen to take to help with discomfort.  Discussed use of ice.  If not improving with ROM next week will refer to Ortho.

## 2024-07-30 NOTE — PATIENT INSTRUCTIONS
IT WAS GREAT TO SEE YOU TODAY!    I WILL CONTACT YOU WITH THE RESULTS OF YOUR X-RAY.    PLEASE TAKE ALL MEDICATION AS DISCUSSED.    ~TAKE THE IBUPROFEN EVERY 8 HOURS WITH FOOD TO HELP WITH PAIN AND INFLAMMATION OF YOUR ANKLE.    GET AN ACE WRAP TO USE FOR COMPRESSION AROUND YOUR ANKLE.    ICE YOUR ANKLE A FEW TIMES PER DAY TO HELP WITH SWELLING AND INFLAMMATION OF THE ANKLE    I WILL SEE YOU AGAIN IN 6 MONTHS BUT PLEASE CALL WITH CONCERNS [phone]

## 2024-07-30 NOTE — PROGRESS NOTES
child    Complication of anesthesia     irregular heart rhythm    Gestational hypertension     Intractable migraine with aura without status migrainosus 08/26/2022    Irregular heart beat     during surgery on knee    Migraine        PAST SURGICAL HISTORY    Past Surgical History:   Procedure Laterality Date    ORTHOPEDIC SURGERY Left     ACL reconstruction    TONSILLECTOMY         FAMILY HISTORY    Family History   Problem Relation Age of Onset    Seizures Maternal Grandmother     Hypertension Maternal Grandmother     Diabetes Maternal Aunt     Hypertension Maternal Aunt     Uterine Cancer Neg Hx     Ovarian Cancer Neg Hx     Colon Cancer Neg Hx     Breast Cancer Neg Hx        SOCIAL HISTORY    Social History     Socioeconomic History    Marital status: Single   Tobacco Use    Smoking status: Never    Smokeless tobacco: Never   Substance and Sexual Activity    Alcohol use: Not Currently     Comment: occ    Drug use: Never    Sexual activity: Yes     Partners: Male     Birth control/protection: None   Social History Narrative    Lives with grandmother  Abuse: Feels safe at home, no history of physical abuse, no history of sexual abuse       Social Determinants of Health     Financial Resource Strain: Low Risk  (5/10/2024)    Overall Financial Resource Strain (CARDIA)     Difficulty of Paying Living Expenses: Not hard at all   Food Insecurity: No Food Insecurity (5/10/2024)    Hunger Vital Sign     Worried About Running Out of Food in the Last Year: Never true     Ran Out of Food in the Last Year: Never true   Transportation Needs: Unknown (5/10/2024)    PRAPARE - Transportation     Lack of Transportation (Non-Medical): No   Housing Stability: Unknown (5/10/2024)    Housing Stability Vital Sign     Unstable Housing in the Last Year: No       MEDICATIONS      Current Outpatient Medications:     ibuprofen (ADVIL;MOTRIN) 800 MG tablet, Take 1 tablet by mouth in the morning and 1 tablet at noon and 1 tablet in the

## 2024-08-07 NOTE — TELEPHONE ENCOUNTER
Briana Salmon 8/6/2024 7:42 AM EDT      ----- Message -----  From: Ame Ahn  Sent: 8/6/2024 7:27 AM EDT  To: *  Subject: Clinical References     Good morning Dr. Foster,    I am messaging you to inform that I am still unable to lift my foot. The swelling only seem to set in if I’m doing a lot of walking or at night. I look forward to hearing from you soon.     Jonathan,  Ame Kaufman

## 2024-08-13 ENCOUNTER — TELEPHONE (OUTPATIENT)
Dept: PRIMARY CARE CLINIC | Facility: CLINIC | Age: 24
End: 2024-08-13

## 2024-08-13 NOTE — TELEPHONE ENCOUNTER
Patient called wanting a work excuse for Saturday and Sunday because her ankle hurt so much she couldn't go to work.